# Patient Record
Sex: MALE | Race: WHITE | Employment: OTHER | URBAN - METROPOLITAN AREA
[De-identification: names, ages, dates, MRNs, and addresses within clinical notes are randomized per-mention and may not be internally consistent; named-entity substitution may affect disease eponyms.]

---

## 2017-09-25 ENCOUNTER — HOSPITAL ENCOUNTER (OUTPATIENT)
Dept: SURGERY | Age: 77
Discharge: HOME | End: 2017-09-25
Admitting: OPHTHALMOLOGY

## 2017-09-25 VITALS
SYSTOLIC BLOOD PRESSURE: 119 MMHG | RESPIRATION RATE: 16 BRPM | HEART RATE: 72 BPM | TEMPERATURE: 97 F | DIASTOLIC BLOOD PRESSURE: 72 MMHG | OXYGEN SATURATION: 98 %

## 2017-09-25 RX ORDER — SODIUM CHLORIDE 0.9 % (FLUSH) 0.9 %
10 SYRINGE (ML) INJECTION EVERY 12 HOURS SCHEDULED
Status: DISCONTINUED | OUTPATIENT
Start: 2017-09-25 | End: 2017-09-25 | Stop reason: SDUPTHER

## 2017-09-25 RX ORDER — SODIUM CHLORIDE 0.9 % (FLUSH) 0.9 %
10 SYRINGE (ML) INJECTION EVERY 12 HOURS SCHEDULED
Status: DISCONTINUED | OUTPATIENT
Start: 2017-09-25 | End: 2017-09-26 | Stop reason: HOSPADM

## 2017-09-25 RX ORDER — SODIUM CHLORIDE 9 MG/ML
INJECTION, SOLUTION INTRAVENOUS CONTINUOUS
Status: DISCONTINUED | OUTPATIENT
Start: 2017-09-25 | End: 2017-09-26 | Stop reason: HOSPADM

## 2017-09-25 RX ORDER — SODIUM CHLORIDE 0.9 % (FLUSH) 0.9 %
10 SYRINGE (ML) INJECTION PRN
Status: DISCONTINUED | OUTPATIENT
Start: 2017-09-25 | End: 2017-09-25 | Stop reason: SDUPTHER

## 2017-09-25 RX ORDER — ONDANSETRON 2 MG/ML
4 INJECTION INTRAMUSCULAR; INTRAVENOUS
Status: ACTIVE | OUTPATIENT
Start: 2017-09-25 | End: 2017-09-25

## 2017-09-25 RX ORDER — SODIUM CHLORIDE 0.9 % (FLUSH) 0.9 %
10 SYRINGE (ML) INJECTION PRN
Status: DISCONTINUED | OUTPATIENT
Start: 2017-09-25 | End: 2017-09-26 | Stop reason: HOSPADM

## 2017-09-25 RX ORDER — TETRACAINE HYDROCHLORIDE 5 MG/ML
1 SOLUTION OPHTHALMIC ONCE
Status: COMPLETED | OUTPATIENT
Start: 2017-09-25 | End: 2017-09-25

## 2017-09-25 RX ORDER — CIPROFLOXACIN HYDROCHLORIDE 3.5 MG/ML
1 SOLUTION/ DROPS TOPICAL SEE ADMIN INSTRUCTIONS
Status: DISCONTINUED | OUTPATIENT
Start: 2017-09-25 | End: 2017-09-26 | Stop reason: HOSPADM

## 2017-09-25 RX ADMIN — SODIUM CHLORIDE: 9 INJECTION, SOLUTION INTRAVENOUS at 10:24

## 2017-09-25 RX ADMIN — Medication: at 10:25

## 2017-09-25 RX ADMIN — TETRACAINE HYDROCHLORIDE 1 DROP: 5 SOLUTION OPHTHALMIC at 10:19

## 2017-09-25 RX ADMIN — CIPROFLOXACIN HYDROCHLORIDE 1 DROP: 3.5 SOLUTION/ DROPS TOPICAL at 10:19

## 2017-09-25 ASSESSMENT — PAIN - FUNCTIONAL ASSESSMENT: PAIN_FUNCTIONAL_ASSESSMENT: 0-10

## 2017-09-25 ASSESSMENT — ENCOUNTER SYMPTOMS: SHORTNESS OF BREATH: 0

## 2017-09-25 ASSESSMENT — PAIN SCALES - GENERAL: PAINLEVEL_OUTOF10: 0

## 2017-12-07 ENCOUNTER — HOSPITAL ENCOUNTER (OUTPATIENT)
Dept: ENDOSCOPY | Age: 77
Discharge: HOME | End: 2017-12-07
Attending: INTERNAL MEDICINE | Admitting: INTERNAL MEDICINE

## 2017-12-07 VITALS
RESPIRATION RATE: 14 BRPM | SYSTOLIC BLOOD PRESSURE: 109 MMHG | HEART RATE: 60 BPM | OXYGEN SATURATION: 98 % | TEMPERATURE: 97 F | DIASTOLIC BLOOD PRESSURE: 69 MMHG

## 2017-12-07 RX ORDER — SODIUM CHLORIDE 0.9 % (FLUSH) 0.9 %
10 SYRINGE (ML) INJECTION PRN
Status: DISCONTINUED | OUTPATIENT
Start: 2017-12-07 | End: 2017-12-07 | Stop reason: SDUPTHER

## 2017-12-07 RX ORDER — SODIUM CHLORIDE 0.9 % (FLUSH) 0.9 %
10 SYRINGE (ML) INJECTION EVERY 12 HOURS SCHEDULED
Status: DISCONTINUED | OUTPATIENT
Start: 2017-12-07 | End: 2017-12-07 | Stop reason: SDUPTHER

## 2017-12-07 RX ORDER — SODIUM CHLORIDE 9 MG/ML
INJECTION, SOLUTION INTRAVENOUS CONTINUOUS
Status: DISCONTINUED | OUTPATIENT
Start: 2017-12-07 | End: 2017-12-08 | Stop reason: HOSPADM

## 2017-12-07 RX ORDER — SODIUM CHLORIDE 9 MG/ML
INJECTION, SOLUTION INTRAVENOUS CONTINUOUS
Status: DISCONTINUED | OUTPATIENT
Start: 2017-12-07 | End: 2017-12-07 | Stop reason: SDUPTHER

## 2017-12-07 RX ORDER — SODIUM CHLORIDE 0.9 % (FLUSH) 0.9 %
10 SYRINGE (ML) INJECTION PRN
Status: DISCONTINUED | OUTPATIENT
Start: 2017-12-07 | End: 2017-12-08 | Stop reason: HOSPADM

## 2017-12-07 RX ORDER — SODIUM CHLORIDE 0.9 % (FLUSH) 0.9 %
10 SYRINGE (ML) INJECTION EVERY 12 HOURS SCHEDULED
Status: DISCONTINUED | OUTPATIENT
Start: 2017-12-07 | End: 2017-12-08 | Stop reason: HOSPADM

## 2017-12-07 RX ADMIN — SODIUM CHLORIDE: 9 INJECTION, SOLUTION INTRAVENOUS at 07:54

## 2017-12-07 ASSESSMENT — ENCOUNTER SYMPTOMS: SHORTNESS OF BREATH: 0

## 2017-12-07 ASSESSMENT — PAIN SCALES - GENERAL
PAINLEVEL_OUTOF10: 0

## 2017-12-07 NOTE — ANESTHESIA POST-OP
[address] [facility] Anesthesiology  Post-Anesthesia Note       Name:  Shelbi Alvarez                                         Age:  76 y.o. MRN:  [id number]     Last Vitals & Oxygen Saturation: /74   Pulse 71   Temp 97 °F (36.1 °C) (Temporal)   Resp 14   SpO2 97%   Patient Vitals for the past 4 hrs:   BP Temp Temp src Pulse Resp SpO2   12/07/17 0940 103/74 - - 71 14 97 %   12/07/17 0930 102/71 97 °F (36.1 °C) Temporal 60 16 99 %   12/07/17 0751 129/78 97.7 °F (36.5 °C) Temporal 70 16 97 %       Level of consciousness:  Awake, alert    Respiratory: Respirations easy, no distress. Stable. Cardiovascular: Hemodynamically stable. Hydration: Adequate. PONV: Adequately managed. Post-op pain: Adequately controlled. Post-op assessment: Tolerated anesthetic well without complication. Complications:  None.     Miguelina Greene MD  December 7, 2017   9:43 AM

## 2017-12-07 NOTE — OP NOTE
Endoscopy Note    Patient: Cassie Martinez  : 1940  Acct#:     Procedure: Esophagogastroduodenoscopy                       Colonoscopy with biopsy    Date:  2017     Surgeon:   Caron Estrada MD    Referring Physician:  Lilian JAVIER     Previous Colonoscopy: YES  Date: 5 Years Prior   Greater than 3 years: YES    Indications: This is a 76y.o. year old male who presents today with Abdominal Bloating/Belching/Constipation/Hx Colon Polyps     Postoperative Diagnosis:  1. Benign appearing Gastric Body Polyps 2. Ascending Colon Polyps 3. Transverse Colon Polyp 4. Pan diverticulosis     Anesthesia:  The patient was administered IV propofol per anesthesiology team.  Please see their operative records for full details. Consent:  The patient or their legal guardian has signed a consent, and is aware of the potential risks, benefits, alternatives, and potential complications of this procedure. These include, but are not limited to hemorrhage, bleeding, post procedural pain, perforation, phlebitis, aspiration, hypotension, hypoxia, cardiovascular events such as arryhthmia, and possibly death. Additionally, the possibility of missed colonic polyps and interval colon cancer was discussed in the consent. Description of Procedure: The patient was then taken to the endoscopy suite, placed in the left lateral decubitus position and the above IV sedation was administrered. The Olympus video endoscope was placed through the patient's oropharynx without difficulty to the extent of the 2nd portion of the duodenum. Both forward and retroflexed views of the stomach were obtained. Findings:    Esophagus: The esophagus appeared normal without evidence of Grewal's esophagus or reflux esophagitis. Stomach: The stomach had evidence of several small polyps (<5 mm) in the body of the stomach. The stomach otherwise appeared normal on forward and retroflexed views.     Duodenum: The 1st, 2nd, 3rd, and 4th portions of the duodenum appeared normal with normal villous pattern. Blood was noted in the second portion of the duodenum the bowel was closely examined with no bleeding lesions identified. It was unclear the source of blood or if the blood was swallowed. The scope was then withdrawn back into the stomach, it was decompressed, and the scope was completely withdrawn. A digital rectal examination was performed and revealed negative without mass, lesions or tenderness. The Olympus video colonoscope was placed in the patient's rectum under digital direction and advanced to the cecum. The cecum was identified by characteristic anatomy and ballottment. The prep was good. The ileocecal valve was identified. The terminal ileum was normal appearing. The scope was then withdrawn back through the cecum, ascending, transverse, descending, sigmoid colon, and rectum. Careful circumferential examination of the mucosa in these areas demonstrated:    1. A 3 mm polyp in the ascending colon removed completely with biopsy polypectomy. 2. A 4 mm polyp in the ascending  colon removed completely with biopsy polypectomy. 3. A 2 mm polyp in the ascending colon removed completely with biopsy polypectomy. 4. A 3 mm polyp in the transverse colon removed completely with biopsy polypectomy. 5. Multiple small and large mouthed diverticulum were present throughout the colon. The scope was then withdrawn into the rectum and retroflexed. The retroflexed view of the anal verge and rectum demonstrates normal rectum. The scope was straightened, the colon was decompressed and the scope was withdrawn from the patient. The patient tolerated the procedure well and was taken to the PACU in good condition. Impression:    1) See post procedure diagnoses    Recommendations:   1) Await pathology results. 2) Continue Align probiotics and recommend starting on Miralax for constipation.    3) I suspect blood in the second portion of the duodenum was swallowed blood as the entire duodenum was normal appearing with no clear bleeding sources. If symptoms persist would consider a CT abd/pelvis to exclude any alternative distal small bowel lesions. 4) I would not recommend further colonoscopies for screening purposes. 5) To attempt to limit adenomas: would avoid smoking, avoid more than 1 alcoholic drink daily, engage in regular physical activity, avoid obesity, limit red meat and processed meat intake, consider pescovegetarian diet (fish and vegetables), take in fiber from a variety of dietary sources, and consider Calcium and Vitamin D supplementation (calcium 1200 mg daily and Vitamin D 800 units daily). 6)  The patient had biopsies taken today. The patient should call for results in 7 days if they have not heard from our office.   Our number is [address] [facility] [facility]

## 2017-12-07 NOTE — ANESTHESIA PRE-OP
Department of Anesthesiology  Preprocedure Note       Name:  Sharyle Liter   Age:  76 y.o.  :  1940                                          MRN:  [id number]         Date:  2017        [facility] Department of Anesthesiology  Pre-Anesthesia Evaluation/Consultation       Name:  Sharyle Liter                                         Age:  76 y.o. MRN:  [id number]           Procedure (Scheduled):  EGD and colonoscopy  Surgeon:  Dr. Neil Keys      No Known Allergies  Patient Active Problem List   Diagnosis    Rotator cuff (capsule) sprain     Past Medical History:   Diagnosis Date    Arthritis     BPH (benign prostatic hyperplasia)     GERD (gastroesophageal reflux disease)     Hyperlipidemia     Irregular heartbeat     Sleep apnea     has cpap machine but does not use     Past Surgical History:   Procedure Laterality Date    CATARACT REMOVAL      left eye    CATARACT REMOVAL WITH IMPLANT Bilateral     COLONOSCOPY      EYE SURGERY Left     FINGER SURGERY      FOOT NEUROMA SURGERY      left foot    FOOT SURGERY  [id number]    EXCISION NEUROMA LEFT 3RD INTERSPACE      TONSILLECTOMY       Social History   Substance Use Topics    Smoking status: Never Smoker    Smokeless tobacco: Never Used    Alcohol use Yes      Comment: rare     Medications  Current Outpatient Prescriptions on File Prior to Encounter   Medication Sig Dispense Refill    MULTIPLE VITAMIN PO Take 1 tablet by mouth daily      latanoprost (XALATAN) 0.005 % ophthalmic solution Place 1 drop into the left eye nightly      pantoprazole (PROTONIX) 40 MG tablet Take 40 mg by mouth daily.  terazosin (HYTRIN) 5 MG capsule Take 5 mg by mouth nightly. Uses medication to treat BPH      finasteride (PROSCAR) 5 MG tablet Take 5 mg by mouth daily. No current facility-administered medications on file prior to encounter.       Current Outpatient Prescriptions   Medication Sig Dispense Refill    MULTIPLE VITAMIN PO Take 1 tablet by FB Dental:      Comment: Missing teeth    Pulmonary: breath sounds clear to auscultation  (+) sleep apnea: on noncompliant,      (-) COPD, asthma, shortness of breath and recent URI                           Cardiovascular:    (+) hyperlipidemia    (-) hypertension, valvular problems/murmurs, past MI, CAD, CABG/stent, dysrhythmias,  angina,  CHF, orthopnea and murmur      Rhythm: regular  Rate: normal                    Neuro/Psych:      (-) seizures, neuromuscular disease, TIA, CVA and headaches           GI/Hepatic/Renal:   (+) GERD:,      (-) PUD, hepatitis, liver disease and no renal disease       Endo/Other:    (+) : arthritis:., .    (-) hypothyroidism, hyperthyroidism, blood dyscrasia, no Type II DM, no Type I DM               Abdominal:           Vascular:                                          Anesthesia Plan      MAC     ASA 3       Induction: intravenous. Anesthetic plan and risks discussed with patient. Plan discussed with CRNA. DOS STAFF ADDENDUM:    Pt seen and examined, chart reviewed (including anesthesia, drug and allergy history). No interval changes to history and physical examination. Anesthetic plan, risks, benefits, alternatives, and personnel involved discussed with patient. Patient verbalized an understanding and agrees to proceed.       Ronnie Grimaldo MD  December 7, 2017  7:28 AM        Ronnie Grimaldo MD   12/7/2017

## 2021-03-16 ENCOUNTER — IMMUNIZATION (OUTPATIENT)
Dept: PRIMARY CARE CLINIC | Age: 81
End: 2021-03-16
Payer: MEDICARE

## 2021-03-16 PROCEDURE — 91300 COVID-19, PFIZER VACCINE 30MCG/0.3ML DOSE: CPT | Performed by: FAMILY MEDICINE

## 2021-03-16 PROCEDURE — 0001A PR IMM ADMN SARSCOV2 30MCG/0.3ML DIL RECON 1ST DOSE: CPT | Performed by: FAMILY MEDICINE

## 2021-04-06 ENCOUNTER — IMMUNIZATION (OUTPATIENT)
Dept: PRIMARY CARE CLINIC | Age: 81
End: 2021-04-06
Payer: MEDICARE

## 2021-04-06 PROCEDURE — 0002A COVID-19, PFIZER VACCINE 30MCG/0.3ML DOSE: CPT | Performed by: FAMILY MEDICINE

## 2021-04-06 PROCEDURE — 91300 COVID-19, PFIZER VACCINE 30MCG/0.3ML DOSE: CPT | Performed by: FAMILY MEDICINE

## 2021-08-27 ENCOUNTER — ANESTHESIA EVENT (OUTPATIENT)
Dept: ENDOSCOPY | Age: 81
End: 2021-08-27
Payer: MEDICARE

## 2021-08-27 RX ORDER — ESOMEPRAZOLE MAGNESIUM 40 MG/1
40 FOR SUSPENSION ORAL DAILY
COMMUNITY

## 2021-08-27 NOTE — PROGRESS NOTES
SAFETY FIRST. .call before you fall      [address]  time______0630______        Surgery time____________    Take the following medications with a sip of water: Follow your Doctor's pre procedure instructions regarding medications    Do not eat or drink anything after 12:00 midnight prior to your surgery. This includes water chewing gum, mints and ice chips. You may brush your teeth and gargle the morning of your surgery, but do not swallow the water     Please see your family doctor/pediatrician for a history and physical and/or concerning medications. Bring any test results/reports from your physicians office. If you are under the care of a heart doctor or specialist doctor, please be aware that you may be asked to them for clearance    You may be asked to stop blood thinners such as Coumadin, Plavix, Fragmin, Lovenox, etc., or any anti-inflammatories such as:  Aspirin, Ibuprofen, Advil, Naproxen prior to your surgery. We also ask that you stop any OTC medications such as fish oil, vitamin E, glucosamine, garlic, Multivitamins, COQ 10, etc.    We ask that you do not smoke 24 hours prior to surgery  We ask that you do not  drink any alcoholic beverages 24 hours prior to surgery     You must make arrangements for a responsible adult to take you home after your surgery. For your safety you will not be allowed to leave alone or drive yourself home. Your surgery will be cancelled if you do not have a ride home. Also for your safety, it is strongly suggested that someone stay with you the first 24 hours after your surgery. A parent or legal guardian must accompany a child scheduled for surgery and plan to stay at the hospital until the child is discharged. Please do not bring other children with you. For your comfort, please wear simple loose fitting clothing to the hospital.  Please do not bring valuables.     Do not wear any make-up or nail polish on your fingers or toes      For your safety, please do not wear any jewelry or body piercing's on the day of surgery. All jewelry must be removed. If you have dentures, they will be removed before going to operating room. For your convenience, we will provide you with a container. If you wear contact lenses or glasses, they will be removed, please bring a case for them. If you have a living will and a durable power of  for healthcare, please bring in a copy. As part of our patient safety program to minimize surgical site infections, we ask you to do the following:    · Please notify your surgeon if you develop any illness between         now and the  day of your surgery. · This includes a cough, cold, fever, sore throat, nausea,         or vomiting, and diarrhea, etc.  ·  Please notify your surgeon if you experience dizziness, shortness         of breath or blurred vision between now and the time of your surgery. Do not shave your operative site 96 hours prior to surgery. For face and neck surgery, men may use an electric razor 48 hours   prior to surgery. You may shower the night before surgery or the morning of   your surgery with an antibacterial soap. You will need to bring a photo ID and insurance card    [facility] has an onsite pharmacy, would you like to utilize our pharmacy     If you will be staying overnight and use a C-pap machine, please bring   your C-pap to hospital     Our goal is to provide you with excellent care, therefore, visitors will be limited to two(2) in the room at a time so that we may focus on providing this care for you. Please contact pre-admission testing if you have any further questions.                  [facility] phone number:  [phone] PAT fax number:  [phone]  Please note these are generalized instructions for all surgical cases, you may be provided with more specific instructions according to your surgery. Preoperative Screening for Elective Surgery/Invasive Procedures While COVID-19 present in the community     Have you tested positive or have been told to self-isolate for COVID-19 like symptoms within the past 28 days? no   Do you currently have any of the following symptoms?no  o Fever >100.0 F or 99.9 F in immunocompromised patients? o New onset cough, shortness of breath or difficulty breathing?  o New onset sore throat, myalgia (muscle aches and pains), headache, loss of taste/smell or diarrhea?  Have you had a potential exposure to COVID-19 within the past 14 days by:no  o Close contact with a confirmed case? o Close contact with a healthcare worker,  or essential infrastructure worker (grocery store, [employer], gas station, public utilities or transportation)? o Do you reside in a congregate setting such as; skilled nursing facility, adult home, correctional facility, homeless shelter or other institutional setting?  o Have you had recent travel to a known COVID-19 hotspot? Indicate if the patient has a positive screen by answering yes to one or more of the above questions. Patients who test positive or screen positive prior to surgery or on the day of surgery should be evaluated in conjunction with the surgeon/proceduralist/anesthesiologist to determine the urgency of the procedure.

## 2021-08-30 ENCOUNTER — HOSPITAL ENCOUNTER (OUTPATIENT)
Age: 81
Setting detail: OUTPATIENT SURGERY
Discharge: HOME | End: 2021-08-30
Attending: INTERNAL MEDICINE | Admitting: INTERNAL MEDICINE
Payer: MEDICARE

## 2021-08-30 ENCOUNTER — ANESTHESIA (OUTPATIENT)
Dept: ENDOSCOPY | Age: 81
End: 2021-08-30
Payer: MEDICARE

## 2021-08-30 VITALS
HEART RATE: 52 BPM | DIASTOLIC BLOOD PRESSURE: 77 MMHG | RESPIRATION RATE: 18 BRPM | WEIGHT: 170 LBS | BODY MASS INDEX: 26.68 KG/M2 | SYSTOLIC BLOOD PRESSURE: 124 MMHG | TEMPERATURE: 97.1 F | OXYGEN SATURATION: 98 % | HEIGHT: 67 IN

## 2021-08-30 VITALS
DIASTOLIC BLOOD PRESSURE: 76 MMHG | RESPIRATION RATE: 14 BRPM | SYSTOLIC BLOOD PRESSURE: 123 MMHG | OXYGEN SATURATION: 96 %

## 2021-08-30 PROCEDURE — 2709999900 HC NON-CHARGEABLE SUPPLY: Performed by: INTERNAL MEDICINE

## 2021-08-30 PROCEDURE — 7100000011 HC PHASE II RECOVERY - ADDTL 15 MIN: Performed by: INTERNAL MEDICINE

## 2021-08-30 PROCEDURE — 3609017100 HC EGD: Performed by: INTERNAL MEDICINE

## 2021-08-30 PROCEDURE — 3700000001 HC ADD 15 MINUTES (ANESTHESIA): Performed by: INTERNAL MEDICINE

## 2021-08-30 PROCEDURE — 7100000000 HC PACU RECOVERY - FIRST 15 MIN: Performed by: INTERNAL MEDICINE

## 2021-08-30 PROCEDURE — 2580000003 HC RX 258: Performed by: ANESTHESIOLOGY

## 2021-08-30 PROCEDURE — 7100000010 HC PHASE II RECOVERY - FIRST 15 MIN: Performed by: INTERNAL MEDICINE

## 2021-08-30 PROCEDURE — 6360000002 HC RX W HCPCS: Performed by: NURSE ANESTHETIST, CERTIFIED REGISTERED

## 2021-08-30 PROCEDURE — 3700000000 HC ANESTHESIA ATTENDED CARE: Performed by: INTERNAL MEDICINE

## 2021-08-30 PROCEDURE — 7100000001 HC PACU RECOVERY - ADDTL 15 MIN: Performed by: INTERNAL MEDICINE

## 2021-08-30 PROCEDURE — 2500000003 HC RX 250 WO HCPCS: Performed by: NURSE ANESTHETIST, CERTIFIED REGISTERED

## 2021-08-30 RX ORDER — SODIUM CHLORIDE 9 MG/ML
25 INJECTION, SOLUTION INTRAVENOUS PRN
Status: DISCONTINUED | OUTPATIENT
Start: 2021-08-30 | End: 2021-08-30 | Stop reason: HOSPADM

## 2021-08-30 RX ORDER — PROMETHAZINE HYDROCHLORIDE 25 MG/ML
6.25 INJECTION, SOLUTION INTRAMUSCULAR; INTRAVENOUS
Status: DISCONTINUED | OUTPATIENT
Start: 2021-08-30 | End: 2021-08-30 | Stop reason: HOSPADM

## 2021-08-30 RX ORDER — SODIUM CHLORIDE 0.9 % (FLUSH) 0.9 %
10 SYRINGE (ML) INJECTION EVERY 12 HOURS SCHEDULED
Status: DISCONTINUED | OUTPATIENT
Start: 2021-08-30 | End: 2021-08-30 | Stop reason: HOSPADM

## 2021-08-30 RX ORDER — SODIUM CHLORIDE 0.9 % (FLUSH) 0.9 %
10 SYRINGE (ML) INJECTION PRN
Status: DISCONTINUED | OUTPATIENT
Start: 2021-08-30 | End: 2021-08-30 | Stop reason: HOSPADM

## 2021-08-30 RX ORDER — PROPOFOL 10 MG/ML
INJECTION, EMULSION INTRAVENOUS CONTINUOUS PRN
Status: DISCONTINUED | OUTPATIENT
Start: 2021-08-30 | End: 2021-08-30 | Stop reason: SDUPTHER

## 2021-08-30 RX ORDER — LIDOCAINE HYDROCHLORIDE 20 MG/ML
INJECTION, SOLUTION EPIDURAL; INFILTRATION; INTRACAUDAL; PERINEURAL PRN
Status: DISCONTINUED | OUTPATIENT
Start: 2021-08-30 | End: 2021-08-30 | Stop reason: SDUPTHER

## 2021-08-30 RX ORDER — ONDANSETRON 2 MG/ML
4 INJECTION INTRAMUSCULAR; INTRAVENOUS
Status: DISCONTINUED | OUTPATIENT
Start: 2021-08-30 | End: 2021-08-30 | Stop reason: HOSPADM

## 2021-08-30 RX ORDER — LABETALOL HYDROCHLORIDE 5 MG/ML
5 INJECTION, SOLUTION INTRAVENOUS EVERY 10 MIN PRN
Status: DISCONTINUED | OUTPATIENT
Start: 2021-08-30 | End: 2021-08-30 | Stop reason: HOSPADM

## 2021-08-30 RX ORDER — SODIUM CHLORIDE 9 MG/ML
INJECTION, SOLUTION INTRAVENOUS CONTINUOUS
Status: DISCONTINUED | OUTPATIENT
Start: 2021-08-30 | End: 2021-08-30 | Stop reason: HOSPADM

## 2021-08-30 RX ADMIN — LIDOCAINE HYDROCHLORIDE 60 MG: 20 INJECTION, SOLUTION EPIDURAL; INFILTRATION; INTRACAUDAL; PERINEURAL at 08:03

## 2021-08-30 RX ADMIN — PROPOFOL 300 MCG/KG/MIN: 10 INJECTION, EMULSION INTRAVENOUS at 08:05

## 2021-08-30 RX ADMIN — SODIUM CHLORIDE: 9 INJECTION, SOLUTION INTRAVENOUS at 06:51

## 2021-08-30 ASSESSMENT — PULMONARY FUNCTION TESTS
PIF_VALUE: 0

## 2021-08-30 ASSESSMENT — PAIN SCALES - GENERAL
PAINLEVEL_OUTOF10: 0
PAINLEVEL_OUTOF10: 0

## 2021-08-30 ASSESSMENT — PAIN - FUNCTIONAL ASSESSMENT: PAIN_FUNCTIONAL_ASSESSMENT: 0-10

## 2021-08-30 NOTE — ANESTHESIA POSTPROCEDURE EVALUATION
[facility] Department of Anesthesiology  Post-Anesthesia Note       Name:  Ishmael Sorto                                  Age:  78 y.o. MRN:  [id number]     Last Vitals & Oxygen Saturation: /77   Pulse 52   Temp 97.1 °F (36.2 °C) (Temporal)   Resp 18   Ht 5' 7\" (1.702 m)   Wt 170 lb (77.1 kg)   SpO2 98%   BMI 26.63 kg/m²   Patient Vitals for the past 4 hrs:   BP Temp Temp src Pulse Resp SpO2 Height Weight   08/30/21 0906 124/77   52 18 98 %     08/30/21 0843 (!) 146/81   56 18 96 %     08/30/21 0830 106/76 97.1 °F (36.2 °C) Temporal 56 19 97 %     08/30/21 0827 106/76   57 16 97 %     08/30/21 0823 106/70   57 12 97 %     08/30/21 0818  97.9 °F (36.6 °C) Temporal   98 %     08/30/21 0639 103/71 97.6 °F (36.4 °C) Temporal 60 17 98 % 5' 7\" (1.702 m) 170 lb (77.1 kg)       Level of consciousness:  Awake, alert    Respiratory: Respirations easy, no distress. Stable. Cardiovascular: Hemodynamically stable. Hydration: Adequate. PONV: Adequately managed. Post-op pain: Adequately controlled. Post-op assessment: Tolerated anesthetic well without complication. Complications:  None.     Arpita Lozano MD  August 30, 2021   9:49 AM

## 2021-08-30 NOTE — PROGRESS NOTES
Medications administered and monitored by CRNA, see anesthesia record. Medications administered and monitored by CRNA, see anesthesia record.     Electronically signed by Marta Villarreal RN on 8/30/2021 at 8:01 AM

## 2021-08-30 NOTE — OP NOTE
noticed improvement in symptoms with switch to Nexium and would recommend continuing. Throat discomfort may also be secondary to post-nasal drip from chronic sinusitis.      Iza Garcia MD  [address] [facility]

## 2021-08-30 NOTE — H&P
7\" (1.702 m)   Wt 170 lb (77.1 kg)   SpO2 98%   BMI 26.63 kg/m²    Airway: No stridor or wheezing noted. Good air movement  Pulmonary: without wheezes. Clear to auscultation  Cardiac:regular rate and rhythm without loud murmurs  Abdomen:soft, nontender,  Bowel sounds present    Pre-Procedure Assessment / Plan:  1) EGD    ASA Grade:  ASA 3 - Patient with moderate systemic disease with functional limitations  Mallampati Classification:  Class III    Level of Sedation Plan:Deep sedation    Post Procedure plan: Return to same level of care    I assessed the patient and find that the patient is in satisfactory condition to proceed with the planned procedure and sedation plan. I have explained the risk, benefits, and alternatives to the procedure; the patient understands and agrees to proceed.        Jaspreet Parks MD  8/30/2021

## 2021-08-30 NOTE — ANESTHESIA PRE PROCEDURE
[facility] Department of Anesthesiology  Pre-Anesthesia Evaluation/Consultation       Name:  Carmenza Grubbs  : 1940  Age:  78 y.o. MRN:  [id number]  Date: 2021           Surgeon: Surgeon(s):  Neville Howell MD    Procedure: Procedure(s):  ESOPHAGOGASTRODUODENOSCOPY     No Known Allergies  Patient Active Problem List   Diagnosis    Rotator cuff (capsule) sprain     Past Medical History:   Diagnosis Date    Arthritis     BPH (benign prostatic hyperplasia)     GERD (gastroesophageal reflux disease)     Hyperlipidemia     Irregular heartbeat     Sleep apnea     has cpap machine but does not use     Past Surgical History:   Procedure Laterality Date    CATARACT REMOVAL      left eye    CATARACT REMOVAL WITH IMPLANT Bilateral     COLONOSCOPY  2017    Dr. Alee Silver with multiple polypectomies    EYE SURGERY Left     FINGER SURGERY      FOOT NEUROMA SURGERY      left foot    FOOT SURGERY  [id number]    EXCISION NEUROMA LEFT 3RD INTERSPACE      TONSILLECTOMY       Social History     Tobacco Use    Smoking status: Never Smoker    Smokeless tobacco: Never Used   Vaping Use    Vaping Use: Never used   Substance Use Topics    Alcohol use: Yes     Comment: rare    Drug use: No     Medications  No current facility-administered medications on file prior to encounter. Current Outpatient Medications on File Prior to Encounter   Medication Sig Dispense Refill    terazosin (HYTRIN) 5 MG capsule Take 5 mg by mouth nightly. Uses medication to treat BPH      finasteride (PROSCAR) 5 MG tablet Take 5 mg by mouth daily.  esomeprazole Magnesium (NEXIUM) 40 MG PACK Take 40 mg by mouth daily      MULTIPLE VITAMIN PO Take 1 tablet by mouth daily      latanoprost (XALATAN) 0.005 % ophthalmic solution Place 1 drop into the left eye nightly      pantoprazole (PROTONIX) 40 MG tablet Take 40 mg by mouth daily.          Current Facility-Administered Medications   Medication Dose Route Frequency Provider Last Rate Last Admin    0.9 % sodium chloride infusion   IntraVENous Continuous Anton Jacobs  mL/hr at 21 New Bag at 21    sodium chloride flush 0.9 % injection 10 mL  10 mL IntraVENous 2 times per day Anton Jacobs MD        sodium chloride flush 0.9 % injection 10 mL  10 mL IntraVENous PRN Anton Jacobs MD        0.9 % sodium chloride infusion  25 mL IntraVENous PRN Anton Jacobs MD         Vital Signs (Current)   Vitals:    21   BP: 103/71   Pulse: 60   Resp: 17   Temp: 97.6 °F (36.4 °C)   SpO2: 98%     Vital Signs Statistics (for past 48 hrs)     Temp  Av.6 °F (36.4 °C)  Min: 97.6 °F (36.4 °C)   Min taken time: 21  Max: 97.6 °F (36.4 °C)   Max taken time: 21  Pulse  Av  Min: 61   Min taken time: 21  Max: 61   Max taken time: 21  Resp  Av  Min: 16   Min taken time: 21  Max: 16   Max taken time: 21  BP  Min: 103/71   Min taken time: 21  Max: 103/71   Max taken time: 21  SpO2  Av %  Min: 98 %   Min taken time: 21  Max: 98 %   Max taken time: 21    BP Readings from Last 3 Encounters:   21 103/71   18 (!) 139/100   17 109/69     BMI  Body mass index is 26.63 kg/m². Estimated body mass index is 26.63 kg/m² as calculated from the following:    Height as of this encounter: 5' 7\" (1.702 m). Weight as of this encounter: 170 lb (77.1 kg).     CBC   Lab Results   Component Value Date    WBC 7.6 2018    RBC 4.47 2018    HGB 13.9 2018    HCT 41.3 2018    MCV 92.5 2018    RDW 13.1 2018     2018     CMP    Lab Results   Component Value Date     2018    K 3.6 2018     2018    CO2 27 2018    BUN 14 2018    CREATININE 0.8 2018    GFRAA >60 2018 AGRATIO 1.7 04/01/2018    LABGLOM >60 04/01/2018    GLUCOSE 100 04/01/2018    PROT 6.8 04/01/2018    CALCIUM 9.0 04/01/2018    BILITOT <0.2 04/01/2018    ALKPHOS 84 04/01/2018    AST 18 04/01/2018    ALT 13 04/01/2018     BMP    Lab Results   Component Value Date     04/01/2018    K 3.6 04/01/2018     04/01/2018    CO2 27 04/01/2018    BUN 14 04/01/2018    CREATININE 0.8 04/01/2018    CALCIUM 9.0 04/01/2018    GFRAA >60 04/01/2018    LABGLOM >60 04/01/2018    GLUCOSE 100 04/01/2018     POCGlucose  No results for input(s): GLUCOSE in the last 72 hours. Coags    Lab Results   Component Value Date    PROTIME 11.7 04/01/2018    INR 1.04 95/27/1068     HCG (If Applicable) No results found for: PREGTESTUR, PREGSERUM, HCG, HCGQUANT   ABGs No results found for: PHART, PO2ART, LST9VPL, UJB9VDY, BEART, T2OWOJOU   Type & Screen (If Applicable)  No results found for: LABABO, LABRH                         BMI: Wt Readings from Last 3 Encounters:       NPO Status:   Date of last liquid consumption: 08/29/21   Time of last liquid consumption: 2300   Date of last solid food consumption: 08/29/21      Time of last solid consumption: 2300       Anesthesia Evaluation  Patient summary reviewed no history of anesthetic complications:   Airway: Mallampati: III  TM distance: >3 FB   Neck ROM: full   Dental:          Pulmonary:normal exam    (+) sleep apnea: on noncompliant,                             Cardiovascular:  Exercise tolerance: good (>4 METS),   (+) dysrhythmias:,       ECG reviewed  Rhythm: regular  Rate: normal           Beta Blocker:  Not on Beta Blocker         Neuro/Psych:   Negative Neuro/Psych ROS              GI/Hepatic/Renal:   (+) GERD: well controlled,           Endo/Other: Negative Endo/Other ROS                    Abdominal:             Vascular: negative vascular ROS. Other Findings:             Anesthesia Plan      MAC     ASA 3       Induction: intravenous.       Anesthetic plan and risks discussed with patient and spouse. Plan discussed with CRNA. This pre-anesthesia assessment may be used as a history and physical.    DOS STAFF ADDENDUM:    Pt seen and examined, chart reviewed (including anesthesia, drug and allergy history). No interval changes to history and physical examination. Anesthetic plan, risks, benefits, alternatives, and personnel involved discussed with patient. Questions and concerns addressed. Patient(family) verbalized an understanding and agrees to proceed.       Jorge Negron MD  August 30, 2021  7:41 AM

## 2024-02-20 ENCOUNTER — APPOINTMENT (OUTPATIENT)
Dept: CT IMAGING | Age: 84
DRG: 552 | End: 2024-02-20
Payer: OTHER MISCELLANEOUS

## 2024-02-20 ENCOUNTER — HOSPITAL ENCOUNTER (INPATIENT)
Age: 84
LOS: 2 days | Discharge: NURSING, REHAB OR LONG-TERM CARE | DRG: 552 | End: 2024-02-23
Attending: EMERGENCY MEDICINE | Admitting: HOSPITALIST
Payer: OTHER MISCELLANEOUS

## 2024-02-20 DIAGNOSIS — S22.000A COMPRESSION FRACTURE OF THORACIC VERTEBRA, UNSPECIFIED THORACIC VERTEBRAL LEVEL, INITIAL ENCOUNTER (HCC): ICD-10-CM

## 2024-02-20 DIAGNOSIS — S32.000A COMPRESSION FRACTURE OF LUMBAR VERTEBRA, UNSPECIFIED LUMBAR VERTEBRAL LEVEL, INITIAL ENCOUNTER (HCC): ICD-10-CM

## 2024-02-20 DIAGNOSIS — R91.1 LUNG NODULE: ICD-10-CM
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
## 2024-02-20 PROBLEM — G47.33 OSA (OBSTRUCTIVE SLEEP APNEA): Status: ACTIVE | Noted: 2024-02-20

## 2024-02-20 PROBLEM — K21.9 GASTROESOPHAGEAL REFLUX DISEASE WITHOUT ESOPHAGITIS: Status: ACTIVE | Noted: 2024-02-20

## 2024-02-20 PROBLEM — N40.0 BPH (BENIGN PROSTATIC HYPERPLASIA): Status: ACTIVE | Noted: 2024-02-20

## 2024-02-20 PROBLEM — E78.00 HYPERCHOLESTEROLEMIA: Status: ACTIVE | Noted: 2024-02-20

## 2024-02-20 LAB
ALBUMIN SERPL-MCNC: 4.2 G/DL (ref 3.4–5)
ALBUMIN/GLOB SERPL: 1.3 {RATIO} (ref 1.1–2.2)
ALP SERPL-CCNC: 74 U/L (ref 40–129)
ALT SERPL-CCNC: 18 U/L (ref 10–40)
ANION GAP SERPL CALCULATED.3IONS-SCNC: 11 MMOL/L (ref 3–16)
AST SERPL-CCNC: 34 U/L (ref 15–37)
BASOPHILS # BLD: 0 K/UL (ref 0–0.2)
BASOPHILS NFR BLD: 0.5 %
BILIRUB SERPL-MCNC: 0.7 MG/DL (ref 0–1)
BUN SERPL-MCNC: 13 MG/DL (ref 7–20)
CALCIUM SERPL-MCNC: 9.6 MG/DL (ref 8.3–10.6)
CHLORIDE SERPL-SCNC: 102 MMOL/L (ref 99–110)
CO2 SERPL-SCNC: 24 MMOL/L (ref 21–32)
CREAT SERPL-MCNC: 0.7 MG/DL (ref 0.8–1.3)
DEPRECATED RDW RBC AUTO: 14.1 % (ref 12.4–15.4)
EOSINOPHIL # BLD: 0.4 K/UL (ref 0–0.6)
EOSINOPHIL NFR BLD: 6 %
GFR SERPLBLD CREATININE-BSD FMLA CKD-EPI: >60 ML/MIN/{1.73_M2}
GLUCOSE SERPL-MCNC: 90 MG/DL (ref 70–99)
HCT VFR BLD AUTO: 38.7 % (ref 40.5–52.5)
HGB BLD-MCNC: 13.3 G/DL (ref 13.5–17.5)
LYMPHOCYTES # BLD: 1.1 K/UL (ref 1–5.1)
LYMPHOCYTES NFR BLD: 19.3 %
MCH RBC QN AUTO: 32.2 PG (ref 26–34)
MCHC RBC AUTO-ENTMCNC: 34.3 G/DL (ref 31–36)
MCV RBC AUTO: 94 FL (ref 80–100)
MONOCYTES # BLD: 0.5 K/UL (ref 0–1.3)
MONOCYTES NFR BLD: 8 %
NEUTROPHILS # BLD: 3.9 K/UL (ref 1.7–7.7)
NEUTROPHILS NFR BLD: 66.2 %
PLATELET # BLD AUTO: 165 K/UL (ref 135–450)
PMV BLD AUTO: 8.2 FL (ref 5–10.5)
POTASSIUM SERPL-SCNC: 4.5 MMOL/L (ref 3.5–5.1)
PROT SERPL-MCNC: 7.4 G/DL (ref 6.4–8.2)
RBC # BLD AUTO: 4.11 M/UL (ref 4.2–5.9)
SODIUM SERPL-SCNC: 137 MMOL/L (ref 136–145)
WBC # BLD AUTO: 5.9 K/UL (ref 4–11)

## 2024-02-20 PROCEDURE — 70450 CT HEAD/BRAIN W/O DYE: CPT

## 2024-02-20 PROCEDURE — 6360000004 HC RX CONTRAST MEDICATION: Performed by: EMERGENCY MEDICINE

## 2024-02-20 PROCEDURE — 36415 COLL VENOUS BLD VENIPUNCTURE: CPT

## 2024-02-20 PROCEDURE — 96374 THER/PROPH/DIAG INJ IV PUSH: CPT

## 2024-02-20 PROCEDURE — 72125 CT NECK SPINE W/O DYE: CPT

## 2024-02-20 PROCEDURE — 71260 CT THORAX DX C+: CPT

## 2024-02-20 PROCEDURE — 80053 COMPREHEN METABOLIC PANEL: CPT

## 2024-02-20 PROCEDURE — 99285 EMERGENCY DEPT VISIT HI MDM: CPT

## 2024-02-20 PROCEDURE — 85025 COMPLETE CBC W/AUTO DIFF WBC: CPT

## 2024-02-20 PROCEDURE — G0378 HOSPITAL OBSERVATION PER HR: HCPCS

## 2024-02-20 PROCEDURE — 6360000002 HC RX W HCPCS: Performed by: EMERGENCY MEDICINE

## 2024-02-20 RX ORDER — POTASSIUM CHLORIDE 7.45 MG/ML
10 INJECTION INTRAVENOUS PRN
Status: DISCONTINUED | OUTPATIENT
Start: 2024-02-20 | End: 2024-02-23 | Stop reason: HOSPADM

## 2024-02-20 RX ORDER — CYCLOBENZAPRINE HCL 10 MG
10 TABLET ORAL 3 TIMES DAILY PRN
Status: DISCONTINUED | OUTPATIENT
Start: 2024-02-20 | End: 2024-02-23 | Stop reason: HOSPADM

## 2024-02-20 RX ORDER — ACETAMINOPHEN 325 MG/1
650 TABLET ORAL EVERY 6 HOURS PRN
Status: DISCONTINUED | OUTPATIENT
Start: 2024-02-20 | End: 2024-02-23 | Stop reason: HOSPADM

## 2024-02-20 RX ORDER — POTASSIUM CHLORIDE 20 MEQ/1
40 TABLET, EXTENDED RELEASE ORAL PRN
Status: DISCONTINUED | OUTPATIENT
Start: 2024-02-20 | End: 2024-02-23 | Stop reason: HOSPADM

## 2024-02-20 RX ORDER — MAGNESIUM HYDROXIDE/ALUMINUM HYDROXICE/SIMETHICONE 120; 1200; 1200 MG/30ML; MG/30ML; MG/30ML
30 SUSPENSION ORAL EVERY 6 HOURS PRN
Status: DISCONTINUED | OUTPATIENT
Start: 2024-02-20 | End: 2024-02-23 | Stop reason: HOSPADM

## 2024-02-20 RX ORDER — MAGNESIUM SULFATE IN WATER 40 MG/ML
2000 INJECTION, SOLUTION INTRAVENOUS PRN
Status: DISCONTINUED | OUTPATIENT
Start: 2024-02-20 | End: 2024-02-23 | Stop reason: HOSPADM

## 2024-02-20 RX ORDER — SODIUM CHLORIDE 0.9 % (FLUSH) 0.9 %
5-40 SYRINGE (ML) INJECTION PRN
Status: DISCONTINUED | OUTPATIENT
Start: 2024-02-20 | End: 2024-02-23 | Stop reason: HOSPADM

## 2024-02-20 RX ORDER — ONDANSETRON 2 MG/ML
4 INJECTION INTRAMUSCULAR; INTRAVENOUS EVERY 6 HOURS PRN
Status: DISCONTINUED | OUTPATIENT
Start: 2024-02-20 | End: 2024-02-23 | Stop reason: HOSPADM

## 2024-02-20 RX ORDER — KETOROLAC TROMETHAMINE 15 MG/ML
15 INJECTION, SOLUTION INTRAMUSCULAR; INTRAVENOUS EVERY 6 HOURS PRN
Status: DISCONTINUED | OUTPATIENT
Start: 2024-02-20 | End: 2024-02-23 | Stop reason: HOSPADM

## 2024-02-20 RX ORDER — DORZOLAMIDE HYDROCHLORIDE AND TIMOLOL MALEATE 20; 5 MG/ML; MG/ML
1 SOLUTION/ DROPS OPHTHALMIC 2 TIMES DAILY
Status: ON HOLD | COMMUNITY

## 2024-02-20 RX ORDER — SODIUM CHLORIDE 0.9 % (FLUSH) 0.9 %
5-40 SYRINGE (ML) INJECTION EVERY 12 HOURS SCHEDULED
Status: DISCONTINUED | OUTPATIENT
Start: 2024-02-20 | End: 2024-02-23 | Stop reason: HOSPADM

## 2024-02-20 RX ORDER — ONDANSETRON 4 MG/1
4 TABLET, ORALLY DISINTEGRATING ORAL EVERY 8 HOURS PRN
Status: DISCONTINUED | OUTPATIENT
Start: 2024-02-20 | End: 2024-02-23 | Stop reason: HOSPADM

## 2024-02-20 RX ORDER — DORZOLAMIDE HYDROCHLORIDE AND TIMOLOL MALEATE 20; 5 MG/ML; MG/ML
1 SOLUTION/ DROPS OPHTHALMIC 2 TIMES DAILY
Status: DISCONTINUED | OUTPATIENT
Start: 2024-02-20 | End: 2024-02-23 | Stop reason: HOSPADM

## 2024-02-20 RX ORDER — DOXAZOSIN MESYLATE 4 MG/1
4 TABLET ORAL NIGHTLY
Status: DISCONTINUED | OUTPATIENT
Start: 2024-02-20 | End: 2024-02-23 | Stop reason: HOSPADM

## 2024-02-20 RX ORDER — ENOXAPARIN SODIUM 100 MG/ML
40 INJECTION SUBCUTANEOUS DAILY
Status: DISCONTINUED | OUTPATIENT
Start: 2024-02-21 | End: 2024-02-23 | Stop reason: HOSPADM

## 2024-02-20 RX ORDER — MORPHINE SULFATE 4 MG/ML
4 INJECTION, SOLUTION INTRAMUSCULAR; INTRAVENOUS ONCE
Status: COMPLETED | OUTPATIENT
Start: 2024-02-20 | End: 2024-02-20

## 2024-02-20 RX ORDER — ACETAMINOPHEN 650 MG/1
650 SUPPOSITORY RECTAL EVERY 6 HOURS PRN
Status: DISCONTINUED | OUTPATIENT
Start: 2024-02-20 | End: 2024-02-23 | Stop reason: HOSPADM

## 2024-02-20 RX ORDER — FINASTERIDE 5 MG/1
5 TABLET, FILM COATED ORAL DAILY
Status: DISCONTINUED | OUTPATIENT
Start: 2024-02-21 | End: 2024-02-23 | Stop reason: HOSPADM

## 2024-02-20 RX ORDER — POLYETHYLENE GLYCOL 3350 17 G/17G
17 POWDER, FOR SOLUTION ORAL DAILY PRN
Status: DISCONTINUED | OUTPATIENT
Start: 2024-02-20 | End: 2024-02-23 | Stop reason: HOSPADM

## 2024-02-20 RX ORDER — SODIUM CHLORIDE 9 MG/ML
INJECTION, SOLUTION INTRAVENOUS PRN
Status: DISCONTINUED | OUTPATIENT
Start: 2024-02-20 | End: 2024-02-23 | Stop reason: HOSPADM

## 2024-02-20 RX ADMIN — IOPAMIDOL 75 ML: 755 INJECTION, SOLUTION INTRAVENOUS at 18:51

## 2024-02-20 RX ADMIN — MORPHINE SULFATE 4 MG: 4 INJECTION, SOLUTION INTRAMUSCULAR; INTRAVENOUS at 18:43

## 2024-02-20 ASSESSMENT — PAIN SCALES - GENERAL
PAINLEVEL_OUTOF10: 8
PAINLEVEL_OUTOF10: 3
PAINLEVEL_OUTOF10: 6

## 2024-02-20 ASSESSMENT — PAIN DESCRIPTION - LOCATION: LOCATION: BACK;RIB CAGE

## 2024-02-20 ASSESSMENT — PAIN - FUNCTIONAL ASSESSMENT: PAIN_FUNCTIONAL_ASSESSMENT: ACTIVITIES ARE NOT PREVENTED

## 2024-02-20 ASSESSMENT — LIFESTYLE VARIABLES
HOW OFTEN DO YOU HAVE A DRINK CONTAINING ALCOHOL: NEVER
HOW MANY STANDARD DRINKS CONTAINING ALCOHOL DO YOU HAVE ON A TYPICAL DAY: PATIENT DOES NOT DRINK

## 2024-02-20 ASSESSMENT — PAIN DESCRIPTION - DESCRIPTORS: DESCRIPTORS: ACHING;SORE

## 2024-02-20 ASSESSMENT — PAIN DESCRIPTION - ORIENTATION: ORIENTATION: RIGHT;LEFT
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
## 2024-02-21 ENCOUNTER — APPOINTMENT (OUTPATIENT)
Dept: MRI IMAGING | Age: 84
DRG: 552 | End: 2024-02-21
Payer: OTHER MISCELLANEOUS

## 2024-02-21 PROBLEM — M54.50 LOWER BACK PAIN: Status: ACTIVE | Noted: 2024-02-21

## 2024-02-21 PROCEDURE — 97535 SELF CARE MNGMENT TRAINING: CPT

## 2024-02-21 PROCEDURE — 97162 PT EVAL MOD COMPLEX 30 MIN: CPT

## 2024-02-21 PROCEDURE — 6370000000 HC RX 637 (ALT 250 FOR IP): Performed by: INTERNAL MEDICINE

## 2024-02-21 PROCEDURE — 2580000003 HC RX 258: Performed by: INTERNAL MEDICINE

## 2024-02-21 PROCEDURE — 97116 GAIT TRAINING THERAPY: CPT

## 2024-02-21 PROCEDURE — 97530 THERAPEUTIC ACTIVITIES: CPT

## 2024-02-21 PROCEDURE — 96372 THER/PROPH/DIAG INJ SC/IM: CPT

## 2024-02-21 PROCEDURE — 72148 MRI LUMBAR SPINE W/O DYE: CPT

## 2024-02-21 PROCEDURE — 6360000002 HC RX W HCPCS: Performed by: INTERNAL MEDICINE

## 2024-02-21 PROCEDURE — G0378 HOSPITAL OBSERVATION PER HR: HCPCS

## 2024-02-21 PROCEDURE — 97166 OT EVAL MOD COMPLEX 45 MIN: CPT

## 2024-02-21 PROCEDURE — 94150 VITAL CAPACITY TEST: CPT

## 2024-02-21 PROCEDURE — 94760 N-INVAS EAR/PLS OXIMETRY 1: CPT

## 2024-02-21 PROCEDURE — 1200000000 HC SEMI PRIVATE

## 2024-02-21 PROCEDURE — 72146 MRI CHEST SPINE W/O DYE: CPT

## 2024-02-21 RX ADMIN — DORZOLAMIDE HYDROCHLORIDE AND TIMOLOL MALEATE 1 DROP: 20; 5 SOLUTION/ DROPS OPHTHALMIC at 09:40

## 2024-02-21 RX ADMIN — FINASTERIDE 5 MG: 5 TABLET, FILM COATED ORAL at 08:45

## 2024-02-21 RX ADMIN — SODIUM CHLORIDE, PRESERVATIVE FREE 10 ML: 5 INJECTION INTRAVENOUS at 08:50

## 2024-02-21 RX ADMIN — DORZOLAMIDE HYDROCHLORIDE AND TIMOLOL MALEATE 1 DROP: 20; 5 SOLUTION/ DROPS OPHTHALMIC at 20:57

## 2024-02-21 RX ADMIN — DOXAZOSIN MESYLATE 4 MG: 4 TABLET ORAL at 00:49

## 2024-02-21 RX ADMIN — ENOXAPARIN SODIUM 40 MG: 100 INJECTION SUBCUTANEOUS at 08:45

## 2024-02-21 RX ADMIN — SODIUM CHLORIDE, PRESERVATIVE FREE 10 ML: 5 INJECTION INTRAVENOUS at 00:13

## 2024-02-21 RX ADMIN — SODIUM CHLORIDE, PRESERVATIVE FREE 10 ML: 5 INJECTION INTRAVENOUS at 20:57

## 2024-02-21 RX ADMIN — DOXAZOSIN MESYLATE 4 MG: 4 TABLET ORAL at 20:57

## 2024-02-21 RX ADMIN — CYCLOBENZAPRINE 10 MG: 10 TABLET, FILM COATED ORAL at 20:57

## 2024-02-21 RX ADMIN — DORZOLAMIDE HYDROCHLORIDE AND TIMOLOL MALEATE 1 DROP: 20; 5 SOLUTION/ DROPS OPHTHALMIC at 00:59

## 2024-02-21 ASSESSMENT — PAIN SCALES - GENERAL
PAINLEVEL_OUTOF10: 3
PAINLEVEL_OUTOF10: 0
PAINLEVEL_OUTOF10: 0

## 2024-02-21 ASSESSMENT — PAIN DESCRIPTION - DESCRIPTORS: DESCRIPTORS: ACHING;SORE;SPASM

## 2024-02-21 ASSESSMENT — PAIN DESCRIPTION - ONSET: ONSET: ON-GOING

## 2024-02-21 ASSESSMENT — PAIN DESCRIPTION - PAIN TYPE: TYPE: ACUTE PAIN

## 2024-02-21 ASSESSMENT — PAIN DESCRIPTION - LOCATION: LOCATION: BACK;RIB CAGE

## 2024-02-21 ASSESSMENT — PAIN DESCRIPTION - ORIENTATION: ORIENTATION: LEFT;RIGHT

## 2024-02-21 ASSESSMENT — PAIN - FUNCTIONAL ASSESSMENT: PAIN_FUNCTIONAL_ASSESSMENT: PREVENTS OR INTERFERES SOME ACTIVE ACTIVITIES AND ADLS

## 2024-02-21 ASSESSMENT — PAIN DESCRIPTION - FREQUENCY: FREQUENCY: CONTINUOUS
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
## 2024-02-21 NOTE — PROGRESS NOTES
4 Eyes Skin Assessment     NAME:  Leeroy Aguirre  YOB: 1940  MEDICAL RECORD NUMBER:  [id number]    The patient is being assessed for  Admission    I agree that at least one RN has performed a thorough Head to Toe Skin Assessment on the patient. ALL assessment sites listed below have been assessed.      Areas assessed by both nurses:    Head, Face, Ears, Shoulders, Back, Chest, Arms, Elbows, Hands, Sacrum. Buttock, Coccyx, Ischium, Legs. Feet and Heels, and Under Medical Devices         Does the Patient have a Wound? No noted wound(s)       Praful Prevention initiated by RN: Yes  Wound Care Orders initiated by RN: No    Pressure Injury (Stage 3,4, Unstageable, DTI, NWPT, and Complex wounds) if present, place Wound referral order by RN under : No    New Ostomies, if present place, Ostomy referral order under : No     Nurse 1 eSignature: Electronically signed by Tamara Bowen RN on 2/21/24 at 2:55 AM EST    **SHARE this note so that the co-signing nurse can place an eSignature**    Nurse 2 eSignature: Electronically signed by Shai Padilla RN on 2/21/24 at 3:08 AM EST

## 2024-02-21 NOTE — PROGRESS NOTES
Patient off floor, MRI.    Alert and oriented to person, place and time, memory intact, behavior appropriate to situation, PERRL.

## 2024-02-21 NOTE — PLAN OF CARE
Problem: Discharge Planning  Goal: Discharge to home or other facility with appropriate resources  2/21/2024 1448 by Elham Peguero RN  Outcome: Progressing  2/21/2024 0256 by Tamara Bowen RN  Outcome: Progressing  Flowsheets (Taken 2/21/2024 0256)  Discharge to home or other facility with appropriate resources:   Identify barriers to discharge with patient and caregiver   Identify discharge learning needs (meds, wound care, etc)   Arrange for needed discharge resources and transportation as appropriate     Problem: Pain  Goal: Verbalizes/displays adequate comfort level or baseline comfort level  2/21/2024 1448 by Elham Peguero RN  Outcome: Progressing  2/21/2024 0256 by Tamara Bowen RN  Outcome: Progressing  Flowsheets (Taken 2/21/2024 0256)  Verbalizes/displays adequate comfort level or baseline comfort level:   Encourage patient to monitor pain and request assistance   Administer analgesics based on type and severity of pain and evaluate response   Assess pain using appropriate pain scale   Implement non-pharmacological measures as appropriate and evaluate response     Problem: Safety - Adult  Goal: Free from fall injury  2/21/2024 1448 by Elham Peguero RN  Outcome: Progressing  2/21/2024 0256 by Tamara Bowen RN  Outcome: Progressing  Flowsheets (Taken 2/21/2024 0256)  Free From Fall Injury: Instruct family/caregiver on patient safety     Problem: ABCDS Injury Assessment  Goal: Absence of physical injury  2/21/2024 1448 by Elham Peguero RN  Outcome: Progressing  2/21/2024 0256 by Tamara Bowen RN  Outcome: Progressing  Flowsheets (Taken 2/21/2024 0256)  Absence of Physical Injury: Implement safety measures based on patient assessment     Problem: Skin/Tissue Integrity  Goal: Absence of new skin breakdown  Description: 1.  Monitor for areas of redness and/or skin breakdown  2.  Assess vascular access sites hourly  3.  Every 4-6 hours minimum:  Change oxygen saturation probe site  4.

## 2024-02-21 NOTE — PROGRESS NOTES
Patient was admitted at 0000 to room 3130 via stretcher. Pt oriented to room, call light, policies and procedures, the menu and ordering. Call light within reach. Bed in lowest position, bed alarm on, and wheels locked. Pt verbalized understanding. No complaints, questions, or concerns at this time.
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
## 2024-02-21 NOTE — PROGRESS NOTES
[facility] called regarding order for TLSO brace. Information provided and face sheet/order faxed per request. Consult paced for neurosurgery as ordered. PT/OT notified of order and will follow up once equipment delivered to work with patient. Electronically signed by Elana Pacheco RN on 2/21/2024 at 10:28 AM
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
## 2024-02-21 NOTE — PLAN OF CARE
Problem: Discharge Planning  Goal: Discharge to home or other facility with appropriate resources  Outcome: Progressing  Flowsheets (Taken 2/21/2024 0256)  Discharge to home or other facility with appropriate resources:   Identify barriers to discharge with patient and caregiver   Identify discharge learning needs (meds, wound care, etc)   Arrange for needed discharge resources and transportation as appropriate     Problem: Pain  Goal: Verbalizes/displays adequate comfort level or baseline comfort level  Outcome: Progressing  Flowsheets (Taken 2/21/2024 0256)  Verbalizes/displays adequate comfort level or baseline comfort level:   Encourage patient to monitor pain and request assistance   Administer analgesics based on type and severity of pain and evaluate response   Assess pain using appropriate pain scale   Implement non-pharmacological measures as appropriate and evaluate response     Problem: Safety - Adult  Goal: Free from fall injury  Outcome: Progressing  Flowsheets (Taken 2/21/2024 0256)  Free From Fall Injury: Instruct family/caregiver on patient safety     Problem: ABCDS Injury Assessment  Goal: Absence of physical injury  Outcome: Progressing  Flowsheets (Taken 2/21/2024 0256)  Absence of Physical Injury: Implement safety measures based on patient assessment     Problem: Skin/Tissue Integrity  Goal: Absence of new skin breakdown  Description: 1.  Monitor for areas of redness and/or skin breakdown  2.  Assess vascular access sites hourly  3.  Every 4-6 hours minimum:  Change oxygen saturation probe site  4.  Every 4-6 hours:  If on nasal continuous positive airway pressure, respiratory therapy assess nares and determine need for appliance change or resting period.  Outcome: Progressing   Electronically signed by Tamara Bowen RN on 2/21/2024 at 2:56 AM

## 2024-02-21 NOTE — CARE COORDINATION
02/21/24 1534   Service Assessment   Patient Orientation Alert and Oriented;Person;Place;Situation   Cognition Alert   History Provided By Patient   Primary Caregiver Self   Accompanied By/Relationship daughter   Support Systems Spouse/Significant Other;Children   Patient's Healthcare Decision Maker is: Legal Next of Kin   PCP Verified by CM Yes   Last Visit to PCP Within last 6 months   Prior Functional Level Independent in ADLs/IADLs   Current Functional Level Assistance with the following:;Bathing;Dressing;Toileting;Mobility;Housework   Can patient return to prior living arrangement Unknown at present   Ability to make needs known: Good   Family able to assist with home care needs: Other (comment)  (unknown at present- pt's wife is also in the hospital)   Would you like for me to discuss the discharge plan with any other family members/significant others, and if so, who? Yes  (daughter at bedside)   Financial Resources Medicare   Community Resources None   CM/VARINDER Referral Other (see comment)  (dc needs)   Discharge Planning   Type of Residence House   Living Arrangements Spouse/Significant Other   Current Services Prior To Admission Durable Medical Equipment   Current DME Prior to Arrival Walker;Other (Comment)  (grab [id number]-long handled shoe horn-reacher)   Potential Assistance Needed Home Care;Skilled Nursing Facility;Other (Comment)  (rehab)   DME Ordered? No   Potential Assistance Purchasing Medications No   Type of Home Care Services None   Patient expects to be discharged to: Unknown   Services At/After Discharge   [facility] Resource Information Provided? No   Mode of Transport at Discharge Other (see comment)  (unknown at present)   Condition of Participation: Discharge Planning   The Plan for Transition of Care is related to the following treatment goals: skilled acilities nad home care   The Patient and/or Patient Representative was provided with a Choice of Provider? Patient   The Patient  [id number]-long handled shoe horn-reacher)            Type of Home Care services:  (P) None    ADLS  Prior functional level: (P) Independent in ADLs/IADLs  Current functional level: (P) Assistance with the following:, Bathing, Dressing, Toileting, Mobility, Housework    PT AM-PAC: 17 /24  OT AM-PAC: 17 /24    Family can provide assistance at DC: (P) Other (comment) (unknown at present- pt's wife is also in the hospital)  Would you like Case Management to discuss the discharge plan with any other family members/significant others, and if so, who? (P) Yes (daughter at bedside)  Plans to Return to Present Housing: (P) Unknown at present  Other Identified Issues/Barriers to RETURNING to current housing: mobility  Potential Assistance needed at discharge: (P) Home Care, Skilled Nursing Facility, Other (Comment) (rehab)            Potential DME:  unknown   Patient expects to discharge to: (P) Unknown  Plan for transportation at discharge:  unknown    Financial    Payor: GENERIC AUTO INSURANCE / Plan: GENERIC AUTO INSURANCE / Product Type: *No Product type* /     Does insurance require precert for SNF: No    Potential assistance Purchasing Medications: (P) No  Meds-to-Beds request: Yes      [facility] [address] [facility] ([facility] [place] - [address] - P [phone] - F [phone]  [address]  [place] [place] [address]  Phone: [phone] Fax: [phone]      Notes:    Factors facilitating achievement of predicted outcomes: Family support, Motivated, Cooperative, and Pleasant    Barriers to discharge: mobility    Additional Case Management Notes:   Met with patient and her daughter at bedside   Pt lives at home with his wife - they live in a house w/ 4STE  Pt independent with his care  We discuss DC needs- pt is unsure of his DC needs  Awaiting MRI  He is hopeful to return home- provided home care list   We also discussed possible need for continued therapy in a facility- He states he

## 2024-02-21 NOTE — PROGRESS NOTES
Patient in bed with eyes open, 2/4 side rails up, bed in lowest position and call light within reach. Patient given morning medications with no complications and has no complaints at this time.     Electronically signed by Elham Peguero RN on 2/21/2024 at 9:37 AM

## 2024-02-21 NOTE — ACP (ADVANCE CARE PLANNING)
Advance Care Planning     Advance Care Planning Inpatient Note  [facility] Department    Today's Date: 2/21/2024  Unit: [facility]TH[facility]    Received request from family.  Upon review of chart and communication with care team, patient's decision making abilities are not in question.. Patient and Child/Children was/were present in the room during visit.    Goals of ACP Conversation:  Discuss advance care planning documents    Health Care Decision Makers:     No healthcare decision makers have been documented.  Click here to complete HealthCare Decision Makers including selection of the Healthcare Decision Maker Relationship (ie \"Primary\")  Summary:  No Decision Maker named by patient at this time    Advance Care Planning Documents (Patient Wishes):  None     Assessment:  Pt doesn't have a HCPOA or LW on file in Epic and it doesn't appear he has completed one before. Pt would like to review the AD docs before completing them.     Interventions:  Provided education on documents for clarity and greater understanding  Discussed and provided education on state decision maker hierarchy  Encouraged ongoing ACP conversation with future decision makers and loved ones  Reviewed but did not complete ACP document    Care Preferences Communicated:   No    Outcomes/Plan:  ACP Discussion: Postponed  Pt to follow-up    Electronically signed by Chaplain Gael on 2/21/2024 at 11:53 AM

## 2024-02-22 LAB
ALBUMIN SERPL-MCNC: 3.9 G/DL (ref 3.4–5)
ALBUMIN/GLOB SERPL: 1.5 {RATIO} (ref 1.1–2.2)
ALP SERPL-CCNC: 66 U/L (ref 40–129)
ALT SERPL-CCNC: 12 U/L (ref 10–40)
ANION GAP SERPL CALCULATED.3IONS-SCNC: 10 MMOL/L (ref 3–16)
AST SERPL-CCNC: 18 U/L (ref 15–37)
BASOPHILS # BLD: 0 K/UL (ref 0–0.2)
BASOPHILS NFR BLD: 0.3 %
BILIRUB SERPL-MCNC: 0.9 MG/DL (ref 0–1)
BUN SERPL-MCNC: 16 MG/DL (ref 7–20)
CALCIUM SERPL-MCNC: 9 MG/DL (ref 8.3–10.6)
CHLORIDE SERPL-SCNC: 101 MMOL/L (ref 99–110)
CO2 SERPL-SCNC: 25 MMOL/L (ref 21–32)
CREAT SERPL-MCNC: 0.8 MG/DL (ref 0.8–1.3)
DEPRECATED RDW RBC AUTO: 14.4 % (ref 12.4–15.4)
EOSINOPHIL # BLD: 0.4 K/UL (ref 0–0.6)
EOSINOPHIL NFR BLD: 5.2 %
GFR SERPLBLD CREATININE-BSD FMLA CKD-EPI: >60 ML/MIN/{1.73_M2}
GLUCOSE SERPL-MCNC: 100 MG/DL (ref 70–99)
HCT VFR BLD AUTO: 34.8 % (ref 40.5–52.5)
HGB BLD-MCNC: 12.3 G/DL (ref 13.5–17.5)
LYMPHOCYTES # BLD: 0.8 K/UL (ref 1–5.1)
LYMPHOCYTES NFR BLD: 12.3 %
MAGNESIUM SERPL-MCNC: 2 MG/DL (ref 1.8–2.4)
MCH RBC QN AUTO: 32.7 PG (ref 26–34)
MCHC RBC AUTO-ENTMCNC: 35.2 G/DL (ref 31–36)
MCV RBC AUTO: 92.8 FL (ref 80–100)
MONOCYTES # BLD: 0.6 K/UL (ref 0–1.3)
MONOCYTES NFR BLD: 9.1 %
NEUTROPHILS # BLD: 5 K/UL (ref 1.7–7.7)
NEUTROPHILS NFR BLD: 73.1 %
PHOSPHATE SERPL-MCNC: 3.3 MG/DL (ref 2.5–4.9)
PLATELET # BLD AUTO: 147 K/UL (ref 135–450)
PMV BLD AUTO: 7.8 FL (ref 5–10.5)
POTASSIUM SERPL-SCNC: 3.5 MMOL/L (ref 3.5–5.1)
PROT SERPL-MCNC: 6.5 G/DL (ref 6.4–8.2)
RBC # BLD AUTO: 3.75 M/UL (ref 4.2–5.9)
SODIUM SERPL-SCNC: 136 MMOL/L (ref 136–145)
WBC # BLD AUTO: 6.8 K/UL (ref 4–11)

## 2024-02-22 PROCEDURE — 1200000000 HC SEMI PRIVATE

## 2024-02-22 PROCEDURE — 85025 COMPLETE CBC W/AUTO DIFF WBC: CPT

## 2024-02-22 PROCEDURE — 97530 THERAPEUTIC ACTIVITIES: CPT

## 2024-02-22 PROCEDURE — 6360000002 HC RX W HCPCS: Performed by: INTERNAL MEDICINE

## 2024-02-22 PROCEDURE — 97110 THERAPEUTIC EXERCISES: CPT

## 2024-02-22 PROCEDURE — 84100 ASSAY OF PHOSPHORUS: CPT

## 2024-02-22 PROCEDURE — 2580000003 HC RX 258: Performed by: INTERNAL MEDICINE

## 2024-02-22 PROCEDURE — 97116 GAIT TRAINING THERAPY: CPT

## 2024-02-22 PROCEDURE — 83735 ASSAY OF MAGNESIUM: CPT

## 2024-02-22 PROCEDURE — 36415 COLL VENOUS BLD VENIPUNCTURE: CPT

## 2024-02-22 PROCEDURE — 6370000000 HC RX 637 (ALT 250 FOR IP): Performed by: INTERNAL MEDICINE

## 2024-02-22 PROCEDURE — 80053 COMPREHEN METABOLIC PANEL: CPT

## 2024-02-22 RX ADMIN — CYCLOBENZAPRINE 10 MG: 10 TABLET, FILM COATED ORAL at 21:45

## 2024-02-22 RX ADMIN — ENOXAPARIN SODIUM 40 MG: 100 INJECTION SUBCUTANEOUS at 09:18

## 2024-02-22 RX ADMIN — DORZOLAMIDE HYDROCHLORIDE AND TIMOLOL MALEATE 1 DROP: 20; 5 SOLUTION/ DROPS OPHTHALMIC at 20:49

## 2024-02-22 RX ADMIN — DOXAZOSIN MESYLATE 4 MG: 4 TABLET ORAL at 20:49

## 2024-02-22 RX ADMIN — SODIUM CHLORIDE, PRESERVATIVE FREE 10 ML: 5 INJECTION INTRAVENOUS at 20:53

## 2024-02-22 RX ADMIN — FINASTERIDE 5 MG: 5 TABLET, FILM COATED ORAL at 09:19

## 2024-02-22 RX ADMIN — SODIUM CHLORIDE, PRESERVATIVE FREE 10 ML: 5 INJECTION INTRAVENOUS at 09:19

## 2024-02-22 RX ADMIN — DORZOLAMIDE HYDROCHLORIDE AND TIMOLOL MALEATE 1 DROP: 20; 5 SOLUTION/ DROPS OPHTHALMIC at 09:19

## 2024-02-22 ASSESSMENT — ENCOUNTER SYMPTOMS
SHORTNESS OF BREATH: 0
BACK PAIN: 1

## 2024-02-22 NOTE — PROGRESS NOTES
Patient is A&Ox4. Patient is resting in bed, awake and quiet. Room air. Side rails are up x3. Fall precautions are in place. Bed alarm on. Bed is in lowest position. Call light, telephone and bedside table are within reach. VSS taken. AM meds given. Shift assessment completed. Will continue to monitor patient per unit protocols. Electronically signed by Liz Montelongo RN on 2/22/2024 at 11:25 AM

## 2024-02-22 NOTE — PLAN OF CARE
Problem: Discharge Planning  Goal: Discharge to home or other facility with appropriate resources  2/21/2024 2140 by Nayely Dimas RN  Outcome: Progressing  Flowsheets (Taken 2/21/2024 2055)  Discharge to home or other facility with appropriate resources:   Arrange for needed discharge resources and transportation as appropriate   Identify barriers to discharge with patient and caregiver     Problem: Pain  Goal: Verbalizes/displays adequate comfort level or baseline comfort level  2/21/2024 2140 by Nayely Dimas RN  Outcome: Progressing  Flowsheets (Taken 2/21/2024 2055)  Verbalizes/displays adequate comfort level or baseline comfort level:   Encourage patient to monitor pain and request assistance   Assess pain using appropriate pain scale   Administer analgesics based on type and severity of pain and evaluate response   Implement non-pharmacological measures as appropriate and evaluate response     Problem: Safety - Adult  Goal: Free from fall injury  2/21/2024 2140 by Nayely Dimas RN  Outcome: Progressing     Problem: ABCDS Injury Assessment  Goal: Absence of physical injury  2/21/2024 2140 by Nayely Dimas RN  Outcome: Progressing  Flowsheets (Taken 2/21/2024 2133)  Absence of Physical Injury: Implement safety measures based on patient assessment     Problem: Skin/Tissue Integrity  Goal: Absence of new skin breakdown  Description: 1.  Monitor for areas of redness and/or skin breakdown  2.  Assess vascular access sites hourly  3.  Every 4-6 hours minimum:  Change oxygen saturation probe site  4.  Every 4-6 hours:  If on nasal continuous positive airway pressure, respiratory therapy assess nares and determine need for appliance change or resting period.  2/21/2024 2140 by Nayely Dimas RN  Outcome: Progressing
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
## 2024-02-22 NOTE — PLAN OF CARE
Problem: Discharge Planning  Goal: Discharge to home or other facility with appropriate resources  2/22/2024 0727 by Liz Montelongo, RN  Outcome: Progressing   Assessed patients knowledge of discharge.  Will continue to work with patient on discharge planning and answer patient questions. Will consult case management and  as necessary.   Problem: Pain  Goal: Verbalizes/displays adequate comfort level or baseline comfort level  2/22/2024 0727 by Liz Montelongo, RN  Outcome: Progressing   Patient educated on acute pain.  Taught patient to use call light to ask for pain medication.  PRN pain medication given for acute pain.  Will continue to monitor pain per unit protocol.    Problem: Safety - Adult  Goal: Free from fall injury  2/22/2024 0727 by Liz Montelongo, RN  Outcome: Progressing   Will remain free from falls. Fall precautions are in place. Call light, telephone and bedside table are within reach.   Problem: ABCDS Injury Assessment  Goal: Absence of physical injury  2/22/2024 0727 by Liz Montelongo RN  Outcome: Progressing     Problem: Skin/Tissue Integrity  Goal: Absence of new skin breakdown  Description: 1.  Monitor for areas of redness and/or skin breakdown  2.  Assess vascular access sites hourly  3.  Every 4-6 hours minimum:  Change oxygen saturation probe site  4.  Every 4-6 hours:  If on nasal continuous positive airway pressure, respiratory therapy assess nares and determine need for appliance change or resting period.  2/22/2024 0727 by Liz Montelongo, RN  Outcome: Progressing   Will monitor skin and mucous members.  Will turn patient every 2 hours, monitor for friction and sheering, and change dressings as needed.  Will preform skin assessment every shift.
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
## 2024-02-22 NOTE — PROGRESS NOTES
Patient is resting in bed, awake and quiet. Family at bedside. Room air. Side rails are up x3. Fall precautions are in place. Bed alarm on. Bed is in lowest position. Call light, telephone and bedside table are within reach. Patient denies needs at present. Will continue to monitor patient per unit protocols. Electronically signed by iLz Montelongo RN on 2/22/2024 at 4:23 PM
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
## 2024-02-22 NOTE — PROGRESS NOTES
PT AAO x4.  C/o pain/spasms in back and rib cage 3/10 on pain scale.  Medicated with PRN Flexeril.  Assessment completed.  Assisted pt to reposition in bed.  Pt performed IS, encouraged usage.  Lungs clear.  No c/o numbness or tingling in extremities.  Pedal pulses palpable.  Denies any needs.  Call light in reach.  Will monitor.

## 2024-02-22 NOTE — CARE COORDINATION
The Plan for Transition of Care is related to the following treatment goals:     Acute rehab at 5-7 days per week.    The Patient and/or patient representative was provided with a choice of provider and agrees   with the discharge plan. [x] Yes [] No    Freedom of choice list was provided with basic dialogue that supports the patient's individualized plan of care/goals, treatment preferences and shares the quality data associated with the providers. [x] Yes [] No    SW and patient reviewed the freedom of choice list and he advised that he prefers acute rehab at [facility]. SW reached out to MD to find out if she is agreeable. If she is the order will be placed, if not SW will return to bedside to discuss alternatives.     Respectfully submitted,    TONYA Cuba  [facility]   [phone]    Electronically signed by MARIA VICTORIA Reyna, JACIEL on 2/22/2024 at 12:10 PM
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
## 2024-02-23 ENCOUNTER — HOSPITAL ENCOUNTER (INPATIENT)
Age: 84
DRG: 561 | End: 2024-02-23
Attending: PHYSICAL MEDICINE & REHABILITATION | Admitting: PHYSICAL MEDICINE & REHABILITATION
Payer: MEDICARE

## 2024-02-23 VITALS
OXYGEN SATURATION: 92 % | HEART RATE: 83 BPM | BODY MASS INDEX: 27.34 KG/M2 | RESPIRATION RATE: 16 BRPM | WEIGHT: 174.16 LBS | HEIGHT: 67 IN | SYSTOLIC BLOOD PRESSURE: 123 MMHG | DIASTOLIC BLOOD PRESSURE: 84 MMHG | TEMPERATURE: 97.8 F

## 2024-02-23 DIAGNOSIS — S32.020A CLOSED COMPRESSION FRACTURE OF L2 LUMBAR VERTEBRA, INITIAL ENCOUNTER (HCC): Primary | ICD-10-CM

## 2024-02-23 PROCEDURE — 6360000002 HC RX W HCPCS: Performed by: INTERNAL MEDICINE

## 2024-02-23 PROCEDURE — 6370000000 HC RX 637 (ALT 250 FOR IP): Performed by: PHYSICAL MEDICINE & REHABILITATION

## 2024-02-23 PROCEDURE — 94760 N-INVAS EAR/PLS OXIMETRY 1: CPT

## 2024-02-23 PROCEDURE — 51798 US URINE CAPACITY MEASURE: CPT

## 2024-02-23 PROCEDURE — 6370000000 HC RX 637 (ALT 250 FOR IP): Performed by: INTERNAL MEDICINE

## 2024-02-23 PROCEDURE — 97535 SELF CARE MNGMENT TRAINING: CPT

## 2024-02-23 PROCEDURE — 97530 THERAPEUTIC ACTIVITIES: CPT

## 2024-02-23 PROCEDURE — 2580000003 HC RX 258: Performed by: INTERNAL MEDICINE

## 2024-02-23 PROCEDURE — 6360000002 HC RX W HCPCS: Performed by: PHYSICAL MEDICINE & REHABILITATION

## 2024-02-23 PROCEDURE — 97116 GAIT TRAINING THERAPY: CPT

## 2024-02-23 PROCEDURE — 1280000000 HC REHAB R&B

## 2024-02-23 RX ORDER — TRAMADOL HYDROCHLORIDE 50 MG/1
50 TABLET ORAL EVERY 6 HOURS PRN
Status: CANCELLED | OUTPATIENT
Start: 2024-02-23

## 2024-02-23 RX ORDER — BISACODYL 10 MG
10 SUPPOSITORY, RECTAL RECTAL DAILY PRN
Status: DISCONTINUED | OUTPATIENT
Start: 2024-02-23 | End: 2024-03-05 | Stop reason: HOSPADM

## 2024-02-23 RX ORDER — MAGNESIUM HYDROXIDE/ALUMINUM HYDROXICE/SIMETHICONE 120; 1200; 1200 MG/30ML; MG/30ML; MG/30ML
30 SUSPENSION ORAL EVERY 6 HOURS PRN
Status: DISCONTINUED | OUTPATIENT
Start: 2024-02-23 | End: 2024-03-05 | Stop reason: HOSPADM

## 2024-02-23 RX ORDER — ACETAMINOPHEN 325 MG/1
650 TABLET ORAL EVERY 6 HOURS PRN
Status: DISCONTINUED | OUTPATIENT
Start: 2024-02-23 | End: 2024-03-05 | Stop reason: HOSPADM

## 2024-02-23 RX ORDER — HYDRALAZINE HYDROCHLORIDE 10 MG/1
10 TABLET, FILM COATED ORAL EVERY 6 HOURS PRN
Status: DISCONTINUED | OUTPATIENT
Start: 2024-02-23 | End: 2024-03-05 | Stop reason: HOSPADM

## 2024-02-23 RX ORDER — CYCLOBENZAPRINE HCL 10 MG
10 TABLET ORAL 3 TIMES DAILY PRN
Status: CANCELLED | OUTPATIENT
Start: 2024-02-23

## 2024-02-23 RX ORDER — DOXAZOSIN MESYLATE 4 MG/1
4 TABLET ORAL NIGHTLY
Status: CANCELLED | OUTPATIENT
Start: 2024-02-23

## 2024-02-23 RX ORDER — DORZOLAMIDE HYDROCHLORIDE AND TIMOLOL MALEATE 20; 5 MG/ML; MG/ML
1 SOLUTION/ DROPS OPHTHALMIC 2 TIMES DAILY
Status: DISCONTINUED | OUTPATIENT
Start: 2024-02-23 | End: 2024-03-05 | Stop reason: HOSPADM

## 2024-02-23 RX ORDER — PANTOPRAZOLE SODIUM 40 MG/1
40 TABLET, DELAYED RELEASE ORAL
Status: CANCELLED | OUTPATIENT
Start: 2024-02-24

## 2024-02-23 RX ORDER — FINASTERIDE 5 MG/1
5 TABLET, FILM COATED ORAL DAILY
Status: CANCELLED | OUTPATIENT
Start: 2024-02-24

## 2024-02-23 RX ORDER — TRAMADOL HYDROCHLORIDE 50 MG/1
50 TABLET ORAL EVERY 6 HOURS PRN
Status: DISCONTINUED | OUTPATIENT
Start: 2024-02-23 | End: 2024-03-01

## 2024-02-23 RX ORDER — ACETAMINOPHEN 325 MG/1
650 TABLET ORAL EVERY 6 HOURS PRN
Status: CANCELLED | OUTPATIENT
Start: 2024-02-23

## 2024-02-23 RX ORDER — POLYETHYLENE GLYCOL 3350 17 G/17G
17 POWDER, FOR SOLUTION ORAL DAILY PRN
Status: CANCELLED | OUTPATIENT
Start: 2024-02-23

## 2024-02-23 RX ORDER — SENNA AND DOCUSATE SODIUM 50; 8.6 MG/1; MG/1
1 TABLET, FILM COATED ORAL 2 TIMES DAILY
Status: DISCONTINUED | OUTPATIENT
Start: 2024-02-23 | End: 2024-03-05 | Stop reason: HOSPADM

## 2024-02-23 RX ORDER — MAGNESIUM HYDROXIDE/ALUMINUM HYDROXICE/SIMETHICONE 120; 1200; 1200 MG/30ML; MG/30ML; MG/30ML
30 SUSPENSION ORAL EVERY 6 HOURS PRN
Status: CANCELLED | OUTPATIENT
Start: 2024-02-23

## 2024-02-23 RX ORDER — POLYETHYLENE GLYCOL 3350 17 G/17G
17 POWDER, FOR SOLUTION ORAL DAILY PRN
Status: DISCONTINUED | OUTPATIENT
Start: 2024-02-23 | End: 2024-03-05 | Stop reason: HOSPADM

## 2024-02-23 RX ORDER — ONDANSETRON 2 MG/ML
4 INJECTION INTRAMUSCULAR; INTRAVENOUS EVERY 6 HOURS PRN
Status: CANCELLED | OUTPATIENT
Start: 2024-02-23

## 2024-02-23 RX ORDER — PANTOPRAZOLE SODIUM 40 MG/1
40 TABLET, DELAYED RELEASE ORAL
Status: DISCONTINUED | OUTPATIENT
Start: 2024-02-24 | End: 2024-02-26

## 2024-02-23 RX ORDER — CYCLOBENZAPRINE HCL 10 MG
10 TABLET ORAL 3 TIMES DAILY PRN
Status: DISCONTINUED | OUTPATIENT
Start: 2024-02-23 | End: 2024-03-05 | Stop reason: HOSPADM

## 2024-02-23 RX ORDER — FINASTERIDE 5 MG/1
5 TABLET, FILM COATED ORAL DAILY
Status: DISCONTINUED | OUTPATIENT
Start: 2024-02-24 | End: 2024-02-26

## 2024-02-23 RX ORDER — LIDOCAINE 4 G/G
1 PATCH TOPICAL DAILY
Status: DISCONTINUED | OUTPATIENT
Start: 2024-02-24 | End: 2024-03-05 | Stop reason: HOSPADM

## 2024-02-23 RX ORDER — HYDRALAZINE HYDROCHLORIDE 10 MG/1
10 TABLET, FILM COATED ORAL EVERY 6 HOURS PRN
Status: CANCELLED | OUTPATIENT
Start: 2024-02-23

## 2024-02-23 RX ORDER — ENOXAPARIN SODIUM 100 MG/ML
40 INJECTION SUBCUTANEOUS DAILY
Status: DISCONTINUED | OUTPATIENT
Start: 2024-02-24 | End: 2024-03-05 | Stop reason: HOSPADM

## 2024-02-23 RX ORDER — ONDANSETRON 4 MG/1
4 TABLET, ORALLY DISINTEGRATING ORAL EVERY 8 HOURS PRN
Status: DISCONTINUED | OUTPATIENT
Start: 2024-02-23 | End: 2024-03-05 | Stop reason: HOSPADM

## 2024-02-23 RX ORDER — ENOXAPARIN SODIUM 100 MG/ML
40 INJECTION SUBCUTANEOUS DAILY
Status: CANCELLED | OUTPATIENT
Start: 2024-02-24

## 2024-02-23 RX ORDER — LANOLIN ALCOHOL/MO/W.PET/CERES
3 CREAM (GRAM) TOPICAL NIGHTLY PRN
Status: CANCELLED | OUTPATIENT
Start: 2024-02-23

## 2024-02-23 RX ORDER — ONDANSETRON 4 MG/1
4 TABLET, ORALLY DISINTEGRATING ORAL EVERY 8 HOURS PRN
Status: CANCELLED | OUTPATIENT
Start: 2024-02-23

## 2024-02-23 RX ORDER — LANOLIN ALCOHOL/MO/W.PET/CERES
3 CREAM (GRAM) TOPICAL NIGHTLY PRN
Status: DISCONTINUED | OUTPATIENT
Start: 2024-02-23 | End: 2024-03-05 | Stop reason: HOSPADM

## 2024-02-23 RX ORDER — BISACODYL 10 MG
10 SUPPOSITORY, RECTAL RECTAL DAILY PRN
Status: CANCELLED | OUTPATIENT
Start: 2024-02-23

## 2024-02-23 RX ORDER — SENNA AND DOCUSATE SODIUM 50; 8.6 MG/1; MG/1
1 TABLET, FILM COATED ORAL 2 TIMES DAILY
Status: CANCELLED | OUTPATIENT
Start: 2024-02-23

## 2024-02-23 RX ORDER — DORZOLAMIDE HYDROCHLORIDE AND TIMOLOL MALEATE 20; 5 MG/ML; MG/ML
1 SOLUTION/ DROPS OPHTHALMIC 2 TIMES DAILY
Status: CANCELLED | OUTPATIENT
Start: 2024-02-23

## 2024-02-23 RX ORDER — DOXAZOSIN MESYLATE 4 MG/1
4 TABLET ORAL NIGHTLY
Status: DISCONTINUED | OUTPATIENT
Start: 2024-02-23 | End: 2024-02-26

## 2024-02-23 RX ORDER — ONDANSETRON 2 MG/ML
4 INJECTION INTRAMUSCULAR; INTRAVENOUS EVERY 6 HOURS PRN
Status: DISCONTINUED | OUTPATIENT
Start: 2024-02-23 | End: 2024-03-05 | Stop reason: HOSPADM

## 2024-02-23 RX ORDER — LIDOCAINE 4 G/G
1 PATCH TOPICAL DAILY
Status: CANCELLED | OUTPATIENT
Start: 2024-02-24

## 2024-02-23 RX ADMIN — ONDANSETRON 4 MG: 2 INJECTION INTRAMUSCULAR; INTRAVENOUS at 21:37

## 2024-02-23 RX ADMIN — FINASTERIDE 5 MG: 5 TABLET, FILM COATED ORAL at 08:34

## 2024-02-23 RX ADMIN — DORZOLAMIDE HYDROCHLORIDE AND TIMOLOL MALEATE 1 DROP: 20; 5 SOLUTION/ DROPS OPHTHALMIC at 21:34

## 2024-02-23 RX ADMIN — SODIUM CHLORIDE, PRESERVATIVE FREE 10 ML: 5 INJECTION INTRAVENOUS at 08:34

## 2024-02-23 RX ADMIN — SENNOSIDES AND DOCUSATE SODIUM 1 TABLET: 8.6; 5 TABLET ORAL at 20:40

## 2024-02-23 RX ADMIN — DOXAZOSIN MESYLATE 4 MG: 4 TABLET ORAL at 20:00

## 2024-02-23 RX ADMIN — ONDANSETRON 4 MG: 2 INJECTION INTRAMUSCULAR; INTRAVENOUS at 08:47

## 2024-02-23 RX ADMIN — DORZOLAMIDE HYDROCHLORIDE AND TIMOLOL MALEATE 1 DROP: 20; 5 SOLUTION/ DROPS OPHTHALMIC at 08:33

## 2024-02-23 RX ADMIN — ENOXAPARIN SODIUM 40 MG: 100 INJECTION SUBCUTANEOUS at 08:33

## 2024-02-23 ASSESSMENT — PAIN SCALES - GENERAL
PAINLEVEL_OUTOF10: 2
PAINLEVEL_OUTOF10: 10

## 2024-02-23 NOTE — PROGRESS NOTES
osteophyte complex ligamentum flavum redundancy peers result in mild-to-moderate central canal stenosis.  Disc osteophyte complex extends to the inferior neural foramina results in mild left and moderate to marked right neural foraminal stenosis. SOFT TISSUES/RETROPERITONEUM: No paraspinal mass is seen.     Acute compression fractures at the T8 and L2 levels approximately 10-15 % and 5-10% loss of height respectively. Vascular disease. Mild dilatation of the ascending thoracic aorta. Pulmonary nodularity. Diverticulosis without evidence of diverticulitis. Prostatomegaly and findings related to chronic bladder outlet obstruction. Large cortical cyst involving the right kidney. Right nephrolithiasis. Additional findings noted above. RECOMMENDATIONS: 6 mm right solid pulmonary nodule within the upper lobe. Per Fleischner Society Guidelines, recommend a non-contrast Chest CT at 6-12 months. If patient is high risk for malignancy, consider an additional non-contrast Chest CT at 18-24 months. If patient is low risk for malignancy, non-contrast Chest CT at 18-24 months is optional. These guidelines do not apply to immunocompromised patients and patients with cancer. Follow up in patients with significant comorbidities as clinically warranted. For lung cancer screening, adhere to Lung-RADS guidelines. Reference: Radiology. 2017; 284(1):228-43. Right Bosniak I benign renal cyst measuring 10.3 cm. No follow-up imaging is recommended. JACR 2018 Feb; 264-273, Management of the Incidental Renal Mass on CT, [employer] 2021; 814-848, Bosniak Classification of Cystic Renal Masses, Version 2019.     CT LUMBAR SPINE BONY RECONSTRUCTION    Result Date: 2/20/2024  EXAMINATION: CT OF THE LUMBAR SPINE WITHOUT CONTRAST; CT OF THE THORACIC SPINE WITHOUT CONTRAST; CT OF THE CHEST, ABDOMEN, AND PELVIS WITH CONTRAST <Completed T EXAMINATION: CT OF THE LUMBAR SPINE WITHOUT CONTRAST; CT OF THE THORACIC SPINE WITHOUT CONTRAST; CT OF THE  the left maxillary sinus.  Mucosal thickening involving the ethmoid air cells.  Remainder of the imaged paranasal sinuses mastoid air cells are well aerated.  Imaged orbits are normal in appearance. SOFT TISSUES/SKULL: No acute abnormality of the visualized skull or soft tissues. CT cervical spine: BONES/ALIGNMENT: There is normal alignment of the spine. The vertebral body heights are maintained. No osseous destructive lesion is seen. DEGENERATIVE CHANGES: Narrowing of the C5-6 and C6-7 intervertebral disc spaces.  Mild osteophyte formation at multiple levels in the cervical spine. Narrowing of the pre dens space with probable bone on bone of the dens and adjacent anterior arch of C1.  Mild Austedo of the right C1-2 articulation likely related to patient positioning.  Cervical occipital junction is intact. No significant central canal stenosis is seen. SOFT TISSUES/RETROPERITONEUM: Vascular calcifications in the neck.  Remainder of the imaged paraspinous soft tissues are unremarkable in appearance.  Mild scarring in the right lung apex.     Age-related changes the brain findings likely related to microvascular ischemic disease.  No acute intracranial process identified. Spondylosis of the cervical spine.  No fracture.       CBC:   Recent Labs     02/20/24  1813 02/22/24  0436   WBC 5.9 6.8   HGB 13.3* 12.3*    147     BMP:    Recent Labs     02/20/24 1813 02/22/24  0436    136   K 4.5 3.5    101   CO2 24 25   BUN 13 16   CREATININE 0.7* 0.8   GLUCOSE 90 100*     Hepatic:   Recent Labs     02/20/24  1813 02/22/24  0436   AST 34 18   ALT 18 12   BILITOT 0.7 0.9   ALKPHOS 74 66     Lipids: No results found for: \"CHOL\", \"HDL\", \"TRIG\"  Hemoglobin A1C: No results found for: \"LABA1C\"  TSH: No results found for: \"TSH\"  Troponin: No results found for: \"TROPONINT\"  Lactic Acid: No results for input(s): \"LACTA\" in the last 72 hours.  BNP: No results for input(s): \"PROBNP\" in the last 72 hours.  UA:No

## 2024-02-23 NOTE — PROGRESS NOTES
Patient is A&Ox4 with intermittent confusion. Patient is resting in bed, awake and quiet. Room air. Side rails are up x3. Fall precautions are in place. Bed alarm on. Bed is in lowest position. Call light, telephone and bedside table are within reach. VSS taken. AM meds given. Shift assessment completed. Will continue to monitor patient per unit protocols. Will continue to monitor patient per unit protocols. Electronically signed by Liz Montelongo RN on 2/23/2024 at 9:09 AM

## 2024-02-23 NOTE — PROGRESS NOTES
Patient is in bed, quiet, with eyes closed. Respirations are even and unlabored. Bed is locked in lowest position with bed alarm on. Bedside table and call light are within reach. Patient is able to communicate needs.
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
## 2024-02-23 NOTE — PLAN OF CARE
Problem: Discharge Planning  Goal: Discharge to home or other facility with appropriate resources  2/22/2024 2059 by Sabine Garcia RN  Outcome: Progressing  Flowsheets (Taken 2/21/2024 2055 by Nayely Dimas, RN)  Discharge to home or other facility with appropriate resources:   Arrange for needed discharge resources and transportation as appropriate   Identify barriers to discharge with patient and caregiver  2/22/2024 0727 by Liz Montelongo RN  Outcome: Progressing     Problem: Pain  Goal: Verbalizes/displays adequate comfort level or baseline comfort level  2/22/2024 2059 by Sabine Garcia RN  Outcome: Progressing  Flowsheets (Taken 2/21/2024 2055 by Nayely Dimas RN)  Verbalizes/displays adequate comfort level or baseline comfort level:   Encourage patient to monitor pain and request assistance   Assess pain using appropriate pain scale   Administer analgesics based on type and severity of pain and evaluate response   Implement non-pharmacological measures as appropriate and evaluate response  2/22/2024 0727 by Liz Montelongo RN  Outcome: Progressing     Problem: Safety - Adult  Goal: Free from fall injury  2/22/2024 2059 by Sabine Garcia RN  Outcome: Progressing  Flowsheets (Taken 2/21/2024 0256 by Tamara Bowen, RN)  Free From Fall Injury: Instruct family/caregiver on patient safety  2/22/2024 0727 by Liz Montelongo RN  Outcome: Progressing     Problem: ABCDS Injury Assessment  Goal: Absence of physical injury  2/22/2024 2059 by Sabine Garcia RN  Outcome: Progressing  Flowsheets (Taken 2/21/2024 2133 by Nayely Dimas, RN)  Absence of Physical Injury: Implement safety measures based on patient assessment  2/22/2024 0727 by Liz Montelongo RN  Outcome: Progressing     Problem: Skin/Tissue Integrity  Goal: Absence of new skin breakdown  Description: 1.  Monitor for areas of redness and/or skin breakdown  2.  Assess vascular access sites hourly  3.  Every 4-6 hours minimum:  Change  oxygen saturation probe site  4.  Every 4-6 hours:  If on nasal continuous positive airway pressure, respiratory therapy assess nares and determine need for appliance change or resting period.  2/22/2024 2059 by Sabine Garcia, RN  Outcome: Progressing  2/22/2024 0727 by Liz Montelongo, RN  Outcome: Progressing

## 2024-02-23 NOTE — DISCHARGE INSTR - COC
Continuity of Care Form    Patient Name: Leeroy Aguirre   :  1940  MRN:  [id number]    Admit date:  2024  Discharge date:  ***    Code Status Order: DNR-CCA   Advance Directives:     Admitting Physician:  Jace Rodriguez MD  PCP: Samuel David MD    Discharging Nurse: ***  Discharging Hospital Unit/Room#: J4N-5971/3130-01  Discharging Unit Phone Number: ***    Emergency Contact:   Extended Emergency Contact Information  Primary Emergency Contact: Tyson Aguirre  Address: [address] [place] [address]            [place] [address] [facility]  Home Phone: [phone]  Mobile Phone: [phone]  Relation: Spouse  Secondary Emergency Contact: Donna Thorpe  Home Phone: [phone]  Relation: Child    Past Surgical History:  Past Surgical History:   Procedure Laterality Date    CATARACT REMOVAL      left eye    CATARACT REMOVAL WITH IMPLANT Bilateral     COLONOSCOPY  2017    Dr. Conrad with multiple polypectomies    EYE SURGERY Left     FINGER SURGERY      FOOT NEUROMA SURGERY      left foot    FOOT SURGERY  2012    EXCISION NEUROMA LEFT 3RD INTERSPACE      TONSILLECTOMY      UPPER GASTROINTESTINAL ENDOSCOPY  2021    Dr. Isma Conrad    UPPER GASTROINTESTINAL ENDOSCOPY N/A 2021    ESOPHAGOGASTRODUODENOSCOPY performed by Isma Conrad MD at [facility] ENDOSCOPY       Immunization History:   Immunization History   Administered Date(s) Administered    COVID-19, MODERNA, (- formula), (age 12y+), IM, 50mcg/0.5mL 2023    COVID-19, PFIZER PURPLE top, DILUTE for use, (age 12 y+), 30mcg/0.3mL 2021, 2021, 2021       Active Problems:  Patient Active Problem List   Diagnosis Code    Rotator cuff (capsule) sprain S43.429A    Compression fracture of thoracic vertebra, unspecified thoracic vertebral level, initial encounter ([facility]) S22.000A    Gastroesophageal reflux disease without esophagitis K21.9    RAHEEM (obstructive sleep apnea) G47.33

## 2024-02-23 NOTE — PLAN OF CARE
Problem: Discharge Planning  Goal: Discharge to home or other facility with appropriate resources  2/23/2024 0747 by Liz Montelongo, RN  Outcome: Progressing   Assessed patients knowledge of discharge.  Will continue to work with patient on discharge planning and answer patient questions. Will consult case management and  as necessary.   Problem: Pain  Goal: Verbalizes/displays adequate comfort level or baseline comfort level  2/23/2024 0747 by Liz Montelongo, RN  Outcome: Progressing   Patient educated on acute pain.  Taught patient to use call light to ask for pain medication.  PRN pain medication given for acute pain.  Will continue to monitor pain per unit protocol.    Problem: Safety - Adult  Goal: Free from fall injury  2/23/2024 0747 by Liz Montelongo, RN  Outcome: Progressing   Will remain free from falls. Fall precautions are in place. Call light, telephone and bedside table are within reach.   Problem: ABCDS Injury Assessment  Goal: Absence of physical injury  2/23/2024 0747 by Liz Montelongo RN  Outcome: Progressing     Problem: Skin/Tissue Integrity  Goal: Absence of new skin breakdown  Description: 1.  Monitor for areas of redness and/or skin breakdown  2.  Assess vascular access sites hourly  3.  Every 4-6 hours minimum:  Change oxygen saturation probe site  4.  Every 4-6 hours:  If on nasal continuous positive airway pressure, respiratory therapy assess nares and determine need for appliance change or resting period.  2/23/2024 0747 by Liz Montelongo, RN  Outcome: Progressing   Will monitor skin and mucous members.  Will turn patient every 2 hours, monitor for friction and sheering, and change dressings as needed.  Will preform skin assessment every shift.

## 2024-02-23 NOTE — PROGRESS NOTES
Patient is leaving for discharge to ARU. This RN called JUNG Toussaint to give report. No further questions at this time. Patient being transferred to [facility] with belongings and discharge instructions. Electronically signed by Liz Montelongo RN on 2/23/2024 at 4:32 PM
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
## 2024-02-23 NOTE — CARE COORDINATION
Spoke with patient and family present in room. Plan is for acute rehab at [facility].   Per care rounds, kyphoplasty is not planned at this time. Left message for Betsy on acute rehab.    Electronically signed by MARIA VICTORIA Moreno, LISW, Case Management on 2/23/2024 at 11:58 AM  [place] [phone]
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
## 2024-02-24 LAB
ANION GAP SERPL CALCULATED.3IONS-SCNC: 8 MMOL/L (ref 3–16)
BASOPHILS # BLD: 0 K/UL (ref 0–0.2)
BASOPHILS NFR BLD: 0.3 %
BUN SERPL-MCNC: 17 MG/DL (ref 7–20)
CALCIUM SERPL-MCNC: 9.2 MG/DL (ref 8.3–10.6)
CHLORIDE SERPL-SCNC: 102 MMOL/L (ref 99–110)
CO2 SERPL-SCNC: 30 MMOL/L (ref 21–32)
CREAT SERPL-MCNC: 0.7 MG/DL (ref 0.8–1.3)
DEPRECATED RDW RBC AUTO: 13.5 % (ref 12.4–15.4)
EOSINOPHIL # BLD: 0.3 K/UL (ref 0–0.6)
EOSINOPHIL NFR BLD: 4.2 %
GFR SERPLBLD CREATININE-BSD FMLA CKD-EPI: >60 ML/MIN/{1.73_M2}
GLUCOSE SERPL-MCNC: 99 MG/DL (ref 70–99)
HCT VFR BLD AUTO: 36.9 % (ref 40.5–52.5)
HGB BLD-MCNC: 12.9 G/DL (ref 13.5–17.5)
LYMPHOCYTES # BLD: 0.9 K/UL (ref 1–5.1)
LYMPHOCYTES NFR BLD: 12.9 %
MCH RBC QN AUTO: 32.4 PG (ref 26–34)
MCHC RBC AUTO-ENTMCNC: 35 G/DL (ref 31–36)
MCV RBC AUTO: 92.7 FL (ref 80–100)
MONOCYTES # BLD: 0.8 K/UL (ref 0–1.3)
MONOCYTES NFR BLD: 11.8 %
NEUTROPHILS # BLD: 4.9 K/UL (ref 1.7–7.7)
NEUTROPHILS NFR BLD: 70.8 %
PLATELET # BLD AUTO: 149 K/UL (ref 135–450)
PMV BLD AUTO: 8 FL (ref 5–10.5)
POTASSIUM SERPL-SCNC: 3.8 MMOL/L (ref 3.5–5.1)
PREALB SERPL-MCNC: 15.4 MG/DL (ref 20–40)
RBC # BLD AUTO: 3.99 M/UL (ref 4.2–5.9)
SODIUM SERPL-SCNC: 140 MMOL/L (ref 136–145)
WBC # BLD AUTO: 7 K/UL (ref 4–11)

## 2024-02-24 PROCEDURE — 97166 OT EVAL MOD COMPLEX 45 MIN: CPT

## 2024-02-24 PROCEDURE — 97535 SELF CARE MNGMENT TRAINING: CPT

## 2024-02-24 PROCEDURE — 97110 THERAPEUTIC EXERCISES: CPT | Performed by: PHYSICAL THERAPIST

## 2024-02-24 PROCEDURE — 36415 COLL VENOUS BLD VENIPUNCTURE: CPT

## 2024-02-24 PROCEDURE — 51798 US URINE CAPACITY MEASURE: CPT

## 2024-02-24 PROCEDURE — 6370000000 HC RX 637 (ALT 250 FOR IP): Performed by: PHYSICAL MEDICINE & REHABILITATION

## 2024-02-24 PROCEDURE — 97116 GAIT TRAINING THERAPY: CPT | Performed by: PHYSICAL THERAPIST

## 2024-02-24 PROCEDURE — 1280000000 HC REHAB R&B

## 2024-02-24 PROCEDURE — 80048 BASIC METABOLIC PNL TOTAL CA: CPT

## 2024-02-24 PROCEDURE — 85025 COMPLETE CBC W/AUTO DIFF WBC: CPT

## 2024-02-24 PROCEDURE — 97162 PT EVAL MOD COMPLEX 30 MIN: CPT | Performed by: PHYSICAL THERAPIST

## 2024-02-24 PROCEDURE — 94760 N-INVAS EAR/PLS OXIMETRY 1: CPT

## 2024-02-24 PROCEDURE — 84134 ASSAY OF PREALBUMIN: CPT

## 2024-02-24 PROCEDURE — 97530 THERAPEUTIC ACTIVITIES: CPT | Performed by: PHYSICAL THERAPIST

## 2024-02-24 PROCEDURE — 6360000002 HC RX W HCPCS: Performed by: PHYSICAL MEDICINE & REHABILITATION

## 2024-02-24 PROCEDURE — 97530 THERAPEUTIC ACTIVITIES: CPT

## 2024-02-24 RX ADMIN — SENNOSIDES AND DOCUSATE SODIUM 1 TABLET: 8.6; 5 TABLET ORAL at 20:29

## 2024-02-24 RX ADMIN — DORZOLAMIDE HYDROCHLORIDE AND TIMOLOL MALEATE 1 DROP: 20; 5 SOLUTION/ DROPS OPHTHALMIC at 20:30

## 2024-02-24 RX ADMIN — POLYETHYLENE GLYCOL 3350 17 G: 17 POWDER, FOR SOLUTION ORAL at 05:16

## 2024-02-24 RX ADMIN — ENOXAPARIN SODIUM 40 MG: 100 INJECTION SUBCUTANEOUS at 09:56

## 2024-02-24 RX ADMIN — FINASTERIDE 5 MG: 5 TABLET, FILM COATED ORAL at 09:56

## 2024-02-24 RX ADMIN — DOXAZOSIN MESYLATE 4 MG: 4 TABLET ORAL at 20:28

## 2024-02-24 RX ADMIN — SENNOSIDES AND DOCUSATE SODIUM 1 TABLET: 8.6; 5 TABLET ORAL at 09:56

## 2024-02-24 RX ADMIN — DORZOLAMIDE HYDROCHLORIDE AND TIMOLOL MALEATE 1 DROP: 20; 5 SOLUTION/ DROPS OPHTHALMIC at 09:56

## 2024-02-24 ASSESSMENT — PAIN SCALES - GENERAL: PAINLEVEL_OUTOF10: 0
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
## 2024-02-24 NOTE — PROGRESS NOTES
Patient admitted to rehab with Closed compression fracture of L2 lumbar vertebra.  A/Ox4. Transfers with walker x1, gait belt. Mobility restrictions: TLSO brace when OOB. On Regular MARGARET diet, tolerating well. Medications taken whole with thins. On Lovenox for DVT prophylaxis.  Skin: Ecchymosis scattered, reddened blanchable buttocks. Oxygen: RA. LDA: PIV right hand. Has been continent of bowel and continent of bladder. LBM 2/20. Chair/bed alarms in use and call light in reach. Will monitor for safety. Electronically signed by Lara Gu RN on 2/24/2024 at 11:00 AM
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
## 2024-02-24 NOTE — FLOWSHEET NOTE
Patient admitted to room 3261 per stretcher. Transferred to ARU unit after d/c from ortho. . Patient was oriented to the Call Light, Phone, TV, Thermostat, Bed Controls, Bathroom and Emergency Cord.  Patient verbalized and demonstrated understanding of all.  Patient was also given an over view of Unit Routines for Acute Rehab, including what to wear for therapy. The patient's role in goal setting was reviewed along with an explanation of the Interdisciplinary Team meeting, the 's role in coordinating services and the Discharge Planning/Continuum of Care process. Patient Rights and Responsibilities were reviewed. Meal times were explained, including how to order food.  The white board (used for communication) was pointed out emphasizing  the 3 hours/day Therapy Schedule (posted most evenings), the number (and process) for reporting grievances, and the Doctor's, Nurse's, and PCA's names. It was recommended that any family that will be care givers or any care givers the patient has, take part in therapy. There are no set visiting hours, and it was suggested that non-caregiver friends and family visitors come after therapy (at 4 PM or later) to allow patient to rest in between sessions.

## 2024-02-24 NOTE — PLAN OF CARE
Problem: Discharge Planning  Goal: Discharge to home or other facility with appropriate resources  Outcome: Progressing     Problem: Safety - Adult  Goal: Free from fall injury  Outcome: Progressing  Flowsheets (Taken 2/24/2024 1056)  Free From Fall Injury: Instruct family/caregiver on patient safety     Problem: ABCDS Injury Assessment  Goal: Absence of physical injury  Outcome: Progressing  Flowsheets (Taken 2/24/2024 1056)  Absence of Physical Injury: Implement safety measures based on patient assessment     Problem: Pain  Goal: Verbalizes/displays adequate comfort level or baseline comfort level  Outcome: Progressing

## 2024-02-24 NOTE — PLAN OF CARE
ARU PATIENT TREATMENT PLAN  [facility]   [address] [place]  [address]  [phone]    Leeroy Aguirre    : 1940  [id number]  MRN: [id number]   PHYSICIAN:  Marian Mendoza MD  Primary Problem    Patient Active Problem List   Diagnosis    Rotator cuff (capsule) sprain    Compression fracture of thoracic vertebra, unspecified thoracic vertebral level, initial encounter ([facility])    Gastroesophageal reflux disease without esophagitis    ARHEEM (obstructive sleep apnea)    Hypercholesterolemia    BPH (benign prostatic hyperplasia)    Lower back pain    Closed compression fracture of L2 lumbar vertebra, initial encounter ([facility])       Rehabilitation Diagnosis:     Closed compression fracture of L2 lumbar vertebra, initial encounter ([facility]) [S32.020A]       ADMIT DATE:2024    Patient Goals: \"I want to be able to take care of myself, be able to drive\"     Admitting Impairments: decreased spine ROM, balance      Acute traumatic T8 and L2 compression fractures  -Managed conservatively per Nsgy (Dr. Grace)  -TLSO when OOB  -PT/OT     Systolic murmur  -Per patient has h/o Rheumatic fever     H/o irregular HR per EMR  -PVCs noted on telemetry  -monitor     RAHEEM  -Noted     OA  -pain control  -PT/OT     Bladder: h/o BPH, High risk retention   -Monitor PVRs, SC prn >400cc  -continue finasteride and doxazosin     Barriers: decreased spine ROM, balance, medical comorbidities   Participation: good     CARE PLAN     NURSING:  Leeroy Aguirre while on this unit will:     [] Be continent of bowel and bladder     [x] Have an adequate number of bowel movements  [x] Urinate with no urinary retention >300ml in bladder  [] Complete bladder protocol with lombardi removal  [x] Maintain O2 SATs at 92%  [x] Have pain managed while on ARU       [] Be pain free by discharge   [x] Have no skin breakdown while on ARU  [] Have improved skin integrity via wound measurements  [] Have no

## 2024-02-24 NOTE — FLOWSHEET NOTE
4 Eyes Skin Assessment     NAME:  Leeroy Aguirre  YOB: 1940  MEDICAL RECORD NUMBER:  [id number]    The patient is being assessed for  Admission    I agree that at least one RN has performed a thorough Head to Toe Skin Assessment on the patient. ALL assessment sites listed below have been assessed.      Areas assessed by both nurses:    Head, Face, Ears, Shoulders, Back, Chest, Arms, Elbows, Hands, Sacrum. Buttock, Coccyx, Ischium, and Legs. Feet and Heels. Blanchable redness on sacrum, perirectal area, coccyx and bilateral heels.        Does the Patient have a Wound? No noted wound(s)       Praful Prevention initiated by RN: No  Wound Care Orders initiated by RN: No    Pressure Injury (Stage 3,4, Unstageable, DTI, NWPT, and Complex wounds) if present, place Wound referral order by RN under : No    New Ostomies, if present place, Ostomy referral order under : No     Nurse 1 eSignature: Electronically signed by Dayanara Wright RN on 2/23/24 at 11:35 PM EST    **SHARE this note so that the co-signing nurse can place an eSignature**    Nurse 2 eSignature: Electronically signed by Diamond Joyce RN on 2/24/24 at 1:42 AM EST

## 2024-02-24 NOTE — PROGRESS NOTES
assistance  Sit to Supine: Partial/moderate assistance   Sit to Stand: Partial/moderate assistance  Chair/Bed to Chair Transfer: Partial/moderate assistance  Car Transfer: Partial/moderate assistance  Ambulation 10 ft: Supervision or touching assistance  Ambulation 50 ft: Supervision or touching assistance  Ambulation 150 ft:    Stairs - 1 Step: Partial/moderate assistance  Stairs - 4 Step: Partial/moderate assistance  Stairs - 12 Step:      OT    Eating: Independent  Oral Hygiene: Supervision or touching assistance  Bathing: Partial/moderate assistance  Upper Body Dressing: Partial/moderate assistance  Lower Body Dressing: Partial/moderate assistance  Toilet Transfer: Supervision or touching assistance  Toilet Hygiene: Supervision or touching assistance    Speech Therapy         ADULT DIET; Regular; No Added Salt (3-4 gm)    Body mass index is 26.55 kg/m².    Assessment and Plan:    Impairments: decreased spine ROM, balance      Acute traumatic T8 and L2 compression fractures  -Managed conservatively per Nsgy (Dr. Grace)  -TLSO when OOB  -PT/OT  -Admit labs reviewed 2/24, stable, noted mild anemia     Systolic murmur  -Per patient has h/o Rheumatic fever     H/o irregular HR per EMR  -PVCs noted on telemetry  -monitor     RAHEEM  -Noted     OA  -pain control  -PT/OT     Bladder: h/o BPH, High risk retention   -Monitor PVRs, SC prn >400cc  -continue finasteride and doxazosin     Bowel: constipation  -High risk constipation   -senna+colace BID, PRN miralax, MoM, and bisacodyl supp.     Safety   -fall precautions     Pain control  -continue acetaminophen prn, add lidocaine patch and prn tramadol     PPx  -DVT: lovenox  -GI: pantoprazole     DNR-CCA    Burke Desai DO   2/24/2024, 3:19 PM

## 2024-02-24 NOTE — FLOWSHEET NOTE
Ethnicity  \"Are you of , /a, or Canadian origin?\"  Check all that apply:  [x] A.  No, not of , /a, or Canadian Origin  [] B.  Yes, Senegalese, Senegalese American, Chicano/a  [] C.  Yes, [place] citizen  [] D.  Yes, Saeed  [] E.  Yes, another , , or Canadian origin  [] X.  Patient unable to respond    Race  \"What is your race?\"  Check all that apply:  [x] A.  White  [] B.  Black or   [] C.   or   [] D.     [] E.  Chinese  [] F.  Pakistani  [] G. Macedonian  [] H.  Yoruba  [] I.  Nigerien  [] J.  Other   [] K.    [] L.  Uzbek or Janeen  [] M.  Belarusian  [] N.  Other   [] X.  Patient unable to respond    High Risk Drug Classes:  Use and Indication    Is taking: Check if the pt is taking any medications by pharmacological classification, not how it is used, in the following classes  Indication noted: If column 1 is checked, check if there is an indication noted for all meds in the drug class Is taking  (check all that apply) Indication noted (check all that apply)   Antipsychotic [] []   Anticoagulant [x] []   Antibiotic [] []   Opioid [x] []   Antiplatelet [] []   Hypoglycemic (including insulin) [] []   None of the above []     Special Treatments, Procedures, and Programs    Check all of the following treatments, procedures, and programs that apply on admission. On admission (check all that apply)   Cancer Treatments   A1. Chemotherapy []           A2. IV []           A3. Oral []           A10. Other []   B1. Radiation []   Respiratory Therapies   C1. Oxygen Therapy []           C2. Continuous []           C3. Intermittent []           C4. High-concentration []   D1. Suctioning []           D2. Scheduled []           D3. As needed []   E1. Tracheostomy Care []   F1. Invasive Mechanical Ventilator (ventilator or respirator) []   G1. Non-invasive Mechanical Ventilator []           G2. BiPAP []
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
## 2024-02-25 PROCEDURE — 6370000000 HC RX 637 (ALT 250 FOR IP): Performed by: PHYSICAL MEDICINE & REHABILITATION

## 2024-02-25 PROCEDURE — 1280000000 HC REHAB R&B

## 2024-02-25 PROCEDURE — 6360000002 HC RX W HCPCS: Performed by: PHYSICAL MEDICINE & REHABILITATION

## 2024-02-25 PROCEDURE — 94760 N-INVAS EAR/PLS OXIMETRY 1: CPT

## 2024-02-25 RX ADMIN — FINASTERIDE 5 MG: 5 TABLET, FILM COATED ORAL at 09:22

## 2024-02-25 RX ADMIN — DORZOLAMIDE HYDROCHLORIDE AND TIMOLOL MALEATE 1 DROP: 20; 5 SOLUTION/ DROPS OPHTHALMIC at 09:22

## 2024-02-25 RX ADMIN — DORZOLAMIDE HYDROCHLORIDE AND TIMOLOL MALEATE 1 DROP: 20; 5 SOLUTION/ DROPS OPHTHALMIC at 20:25

## 2024-02-25 RX ADMIN — DOXAZOSIN MESYLATE 4 MG: 4 TABLET ORAL at 20:25

## 2024-02-25 RX ADMIN — SENNOSIDES AND DOCUSATE SODIUM 1 TABLET: 8.6; 5 TABLET ORAL at 20:25

## 2024-02-25 RX ADMIN — ENOXAPARIN SODIUM 40 MG: 100 INJECTION SUBCUTANEOUS at 09:22

## 2024-02-25 RX ADMIN — SENNOSIDES AND DOCUSATE SODIUM 1 TABLET: 8.6; 5 TABLET ORAL at 09:22

## 2024-02-25 ASSESSMENT — PAIN SCALES - GENERAL: PAINLEVEL_OUTOF10: 0

## 2024-02-25 NOTE — PLAN OF CARE
Problem: Discharge Planning  Goal: Discharge to home or other facility with appropriate resources  2/25/2024 1024 by Lara Gu RN  Outcome: Progressing     Problem: Safety - Adult  Goal: Free from fall injury  2/25/2024 1024 by Lara Gu RN  Outcome: Progressing  Flowsheets (Taken 2/25/2024 1020)  Free From Fall Injury: Instruct family/caregiver on patient safety     Problem: ABCDS Injury Assessment  Goal: Absence of physical injury  Outcome: Progressing  Flowsheets (Taken 2/25/2024 1020)  Absence of Physical Injury: Implement safety measures based on patient assessment     Problem: Pain  Goal: Verbalizes/displays adequate comfort level or baseline comfort level  2/25/2024 1024 by Lara Gu RN  Outcome: Progressing     Problem: Neurosensory - Adult  Goal: Achieves stable or improved neurological status  Outcome: Progressing     Problem: Neurosensory - Adult  Goal: Achieves maximal functionality and self care  Outcome: Progressing

## 2024-02-25 NOTE — PROGRESS NOTES
This is a 83 y.o.  male admitted on 2/23/2024 with Closed compression fracture of L2 lumbar vertebra, initial encounter ([facility]) [S32.020A]. A&O X 4. Active bowel sounds. Last BM 2/24/2024. Patient is continent of bowel & bladder. He is on Lovenox to for DVT prophylaxis. He is on a regular, MARGARET diet. Medications taken whole with thins. Skin: scattered bruising. Saline lock to right hand. Transfers with walker x 1. HS medication given. Tolerated well.  Call light and bedside table within reach. Patient instructed to call if he needs anything.

## 2024-02-25 NOTE — PROGRESS NOTES
Patient admitted to rehab with Closed compression fracture of L2 lumbar vertebra.  A/Ox4. Transfers with walker x1, gait belt. Mobility restrictions: TLSO brace when out of bed. On regular, MARGARET diet, tolerating well. Medications taken whole with thins. On Lovenox for DVT prophylaxis.  Skin: Scattered ecchymosis, reddened blanchable sacrum. Oxygen: RA. LDA: PIV right dorsal hand. Has been continent of bowel and continent of bladder. LBM 2/24. Chair/bed alarms in use and call light in reach. Will monitor for safety. Electronically signed by Lara Gu RN on 2/25/2024 at 10:26 AM

## 2024-02-25 NOTE — PLAN OF CARE
Problem: Discharge Planning  Goal: Discharge to home or other facility with appropriate resources  2/24/2024 2245 by Maryellen Gastelum, RN  Outcome: Progressing     Problem: Safety - Adult  Goal: Free from fall injury  2/24/2024 2245 by Maryellen Gastelum, RN  Outcome: Progressing     Problem: Pain  Goal: Verbalizes/displays adequate comfort level or baseline comfort level  2/24/2024 2245 by Maryellen Gastelum, RN  Outcome: Progressing     Problem: Skin/Tissue Integrity - Adult  Goal: Skin integrity remains intact  Outcome: Progressing  Flowsheets (Taken 2/24/2024 1056 by Lara Gu, RN)  Skin Integrity Remains Intact: Monitor for areas of redness and/or skin breakdown

## 2024-02-26 LAB
ANION GAP SERPL CALCULATED.3IONS-SCNC: 10 MMOL/L (ref 3–16)
BASOPHILS # BLD: 0 K/UL (ref 0–0.2)
BASOPHILS NFR BLD: 0.5 %
BUN SERPL-MCNC: 16 MG/DL (ref 7–20)
CALCIUM SERPL-MCNC: 8.8 MG/DL (ref 8.3–10.6)
CHLORIDE SERPL-SCNC: 103 MMOL/L (ref 99–110)
CO2 SERPL-SCNC: 26 MMOL/L (ref 21–32)
CREAT SERPL-MCNC: 0.7 MG/DL (ref 0.8–1.3)
DEPRECATED RDW RBC AUTO: 13.9 % (ref 12.4–15.4)
EOSINOPHIL # BLD: 0.5 K/UL (ref 0–0.6)
EOSINOPHIL NFR BLD: 8.5 %
GFR SERPLBLD CREATININE-BSD FMLA CKD-EPI: >60 ML/MIN/{1.73_M2}
GLUCOSE SERPL-MCNC: 100 MG/DL (ref 70–99)
HCT VFR BLD AUTO: 35 % (ref 40.5–52.5)
HGB BLD-MCNC: 12.1 G/DL (ref 13.5–17.5)
LYMPHOCYTES # BLD: 0.9 K/UL (ref 1–5.1)
LYMPHOCYTES NFR BLD: 15 %
MCH RBC QN AUTO: 31.9 PG (ref 26–34)
MCHC RBC AUTO-ENTMCNC: 34.5 G/DL (ref 31–36)
MCV RBC AUTO: 92.4 FL (ref 80–100)
MONOCYTES # BLD: 0.7 K/UL (ref 0–1.3)
MONOCYTES NFR BLD: 11.9 %
NEUTROPHILS # BLD: 3.9 K/UL (ref 1.7–7.7)
NEUTROPHILS NFR BLD: 64.1 %
PLATELET # BLD AUTO: 172 K/UL (ref 135–450)
PMV BLD AUTO: 7.9 FL (ref 5–10.5)
POTASSIUM SERPL-SCNC: 3.6 MMOL/L (ref 3.5–5.1)
RBC # BLD AUTO: 3.79 M/UL (ref 4.2–5.9)
SODIUM SERPL-SCNC: 139 MMOL/L (ref 136–145)
WBC # BLD AUTO: 6.1 K/UL (ref 4–11)

## 2024-02-26 PROCEDURE — 80048 BASIC METABOLIC PNL TOTAL CA: CPT

## 2024-02-26 PROCEDURE — 94760 N-INVAS EAR/PLS OXIMETRY 1: CPT

## 2024-02-26 PROCEDURE — 97530 THERAPEUTIC ACTIVITIES: CPT

## 2024-02-26 PROCEDURE — 97110 THERAPEUTIC EXERCISES: CPT

## 2024-02-26 PROCEDURE — 6370000000 HC RX 637 (ALT 250 FOR IP): Performed by: PHYSICAL MEDICINE & REHABILITATION

## 2024-02-26 PROCEDURE — 85025 COMPLETE CBC W/AUTO DIFF WBC: CPT

## 2024-02-26 PROCEDURE — 97116 GAIT TRAINING THERAPY: CPT

## 2024-02-26 PROCEDURE — 36415 COLL VENOUS BLD VENIPUNCTURE: CPT

## 2024-02-26 PROCEDURE — 6360000002 HC RX W HCPCS: Performed by: PHYSICAL MEDICINE & REHABILITATION

## 2024-02-26 PROCEDURE — 1280000000 HC REHAB R&B

## 2024-02-26 PROCEDURE — 97535 SELF CARE MNGMENT TRAINING: CPT

## 2024-02-26 RX ORDER — DOXAZOSIN MESYLATE 4 MG/1
4 TABLET ORAL NIGHTLY
Status: DISCONTINUED | OUTPATIENT
Start: 2024-02-26 | End: 2024-02-26

## 2024-02-26 RX ORDER — FINASTERIDE 5 MG/1
5 TABLET, FILM COATED ORAL NIGHTLY
Status: DISCONTINUED | OUTPATIENT
Start: 2024-02-27 | End: 2024-03-05 | Stop reason: HOSPADM

## 2024-02-26 RX ORDER — TERAZOSIN 5 MG/1
5 CAPSULE ORAL NIGHTLY
Status: DISCONTINUED | OUTPATIENT
Start: 2024-02-26 | End: 2024-02-26

## 2024-02-26 RX ORDER — FINASTERIDE 5 MG/1
5 TABLET, FILM COATED ORAL DAILY
Status: DISCONTINUED | OUTPATIENT
Start: 2024-02-26 | End: 2024-02-26

## 2024-02-26 RX ORDER — TERAZOSIN 5 MG/1
5 CAPSULE ORAL NIGHTLY
Status: DISCONTINUED | OUTPATIENT
Start: 2024-02-26 | End: 2024-03-05 | Stop reason: HOSPADM

## 2024-02-26 RX ADMIN — DORZOLAMIDE HYDROCHLORIDE AND TIMOLOL MALEATE 1 DROP: 20; 5 SOLUTION/ DROPS OPHTHALMIC at 21:55

## 2024-02-26 RX ADMIN — FINASTERIDE 5 MG: 5 TABLET, FILM COATED ORAL at 07:46

## 2024-02-26 RX ADMIN — DORZOLAMIDE HYDROCHLORIDE AND TIMOLOL MALEATE 1 DROP: 20; 5 SOLUTION/ DROPS OPHTHALMIC at 07:38

## 2024-02-26 RX ADMIN — TRAMADOL HYDROCHLORIDE 50 MG: 50 TABLET ORAL at 13:42

## 2024-02-26 RX ADMIN — SENNOSIDES AND DOCUSATE SODIUM 1 TABLET: 8.6; 5 TABLET ORAL at 07:46

## 2024-02-26 RX ADMIN — TERAZOSIN 5 MG: 5 CAPSULE ORAL at 21:54

## 2024-02-26 RX ADMIN — TRAMADOL HYDROCHLORIDE 50 MG: 50 TABLET ORAL at 07:46

## 2024-02-26 RX ADMIN — ENOXAPARIN SODIUM 40 MG: 100 INJECTION SUBCUTANEOUS at 07:45

## 2024-02-26 RX ADMIN — DICLOFENAC SODIUM 2 G: 10 GEL TOPICAL at 21:54

## 2024-02-26 ASSESSMENT — PAIN DESCRIPTION - DESCRIPTORS
DESCRIPTORS: ACHING
DESCRIPTORS: ACHING

## 2024-02-26 ASSESSMENT — PAIN DESCRIPTION - PAIN TYPE
TYPE: ACUTE PAIN
TYPE: ACUTE PAIN

## 2024-02-26 ASSESSMENT — PAIN DESCRIPTION - LOCATION
LOCATION: BACK
LOCATION: BACK

## 2024-02-26 ASSESSMENT — PAIN SCALES - GENERAL
PAINLEVEL_OUTOF10: 7
PAINLEVEL_OUTOF10: 0
PAINLEVEL_OUTOF10: 0
PAINLEVEL_OUTOF10: 7
PAINLEVEL_OUTOF10: 0

## 2024-02-26 ASSESSMENT — PAIN DESCRIPTION - ORIENTATION: ORIENTATION: LOWER
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
## 2024-02-26 NOTE — PROGRESS NOTES
[facility] Inpatient Rehabilitation Progress Note    Leeroy Aguirre  2/25/2024  [id number]    Chief Complaint: Closed compression fracture of L2 lumbar vertebra, initial encounter ([facility])    Subjective:   No acute events overnight. Patient states concern about his home medications, stated he was getting different ones at home. Discussed same medications were being given presently. States continued poor taste of his food, discussed importance of no added salt diet.    ROS: Patient Denies Fevers/Chills, Nausea/vomiting, or Chest pain.     Objective:  Patient Vitals for the past 24 hrs:   BP Temp Temp src Pulse Resp SpO2 Weight   02/25/24 2024 112/70 97.8 °F (36.6 °C) Oral 78 16 94 % --   02/25/24 0917 115/69 97.2 °F (36.2 °C) Oral 80 18 95 % --   02/25/24 0530 -- -- -- -- -- -- 76.5 kg (168 lb 10.4 oz)       Gen: No distress, pleasant.   HEENT: Normocephalic, atraumatic.   CV: Regular rate and rhythm.   Resp: No respiratory distress.   Abd: Soft, nontender, Spinal brace in place.  Ext: No edema.   Neuro: Alert, oriented, appropriately interactive.     Wt Readings from Last 3 Encounters:   02/25/24 76.5 kg (168 lb 10.4 oz)   02/23/24 79 kg (174 lb 2.6 oz)   08/30/21 77.1 kg (170 lb)       Laboratory data:   Lab Results   Component Value Date    WBC 7.0 02/24/2024    HGB 12.9 (L) 02/24/2024    HCT 36.9 (L) 02/24/2024    MCV 92.7 02/24/2024     02/24/2024       Lab Results   Component Value Date/Time     02/24/2024 07:35 AM    K 3.8 02/24/2024 07:35 AM     02/24/2024 07:35 AM    CO2 30 02/24/2024 07:35 AM    BUN 17 02/24/2024 07:35 AM    CREATININE 0.7 02/24/2024 07:35 AM    GLUCOSE 99 02/24/2024 07:35 AM    CALCIUM 9.2 02/24/2024 07:35 AM        Therapy progress:       PT    Supine to Sit: Partial/moderate assistance  Sit to Supine: Partial/moderate assistance   Sit to Stand: Partial/moderate assistance  Chair/Bed to Chair Transfer: Partial/moderate assistance  Car Transfer: Partial/moderate  assistance  Ambulation 10 ft: Supervision or touching assistance  Ambulation 50 ft: Supervision or touching assistance  Ambulation 150 ft:    Stairs - 1 Step: Partial/moderate assistance  Stairs - 4 Step: Partial/moderate assistance  Stairs - 12 Step:      OT    Eating: Independent  Oral Hygiene: Supervision or touching assistance  Bathing: Partial/moderate assistance  Upper Body Dressing: Partial/moderate assistance  Lower Body Dressing: Partial/moderate assistance  Toilet Transfer: Supervision or touching assistance  Toilet Hygiene: Supervision or touching assistance    Speech Therapy         ADULT DIET; Regular; No Added Salt (3-4 gm)  ADULT ORAL NUTRITION SUPPLEMENT; Breakfast, Lunch, Dinner; Standard High Calorie/High Protein Oral Supplement    Body mass index is 26.41 kg/m².    Assessment and Plan:    Impairments: decreased spine ROM, balance      Acute traumatic T8 and L2 compression fractures  -Managed conservatively per [facility] (Dr. Grace)  -TLSO when OOB  -PT/OT  -Admit labs reviewed 2/24, stable, noted mild anemia     Systolic murmur  -Per patient has h/o Rheumatic fever     H/o irregular HR per EMR  -PVCs noted on telemetry  -monitor     RAHEEM  -Noted     OA  -pain control  -PT/OT     Bladder: h/o BPH, High risk retention   -Monitor PVRs, SC prn >400cc  -continue finasteride and doxazosin     Bowel: constipation  -High risk constipation   -senna+colace BID, PRN miralax, MoM, and bisacodyl supp.     Safety   -fall precautions     Pain control  -continue acetaminophen prn, add lidocaine patch and prn tramadol     PPx  -DVT: lovenox  -GI: pantoprazole     DNR-CCA    Burke Desai DO   2/25/2024, 9:58 PM

## 2024-02-26 NOTE — PROGRESS NOTES
therapy at least 5 out of 7 days a week  Days Per Week: 5 Days  Hours Per Day: 1.5 hours  Therapy Duration: 10 Days  Current Treatment Recommendations: Functional mobility training;Transfer training;Gait training;Stair training;Safety education & training;Patient/Caregiver education & training;Strengthening;Balance training;Endurance training;Home exercise program;Equipment evaluation, education, & procurement;Therapeutic activities  Safety Devices  Type of Devices: Gait belt;All fall risk precautions in place;Left in chair (returned to room w/ transport)    EDUCATION  Education  Education Given To: Patient  Education Provided: Role of Therapy;Plan of Care;Precautions;Safety;Mobility Training;Transfer Training;Fall Prevention Strategies  Education Provided Comments: BLT precautions  Education Method: Demonstration;Verbal  Barriers to Learning: Cognition;Hearing  Education Outcome: Verbalized understanding;Continued education needed    PHYSICAL THERAPY  Progress Note   Second Session    Subjective: Pt presents to gym via w/c, agreeable to PT. Denies pain.    Objective: Amb ~200 ft x 2 from the therapy gym to other gym w/ car using RW, SBA - cues for slowing down and steadiness with turns.    Practiced car transfer and curb step (see above).    Amb back to the ADL bedroom to practice bed mobility (see above).  Amb additional short distance w/ RW, SBA from room to the wheelchair.     Seated ex: B 2 x 10: LAQ, marches, Hip ADD ball squeeze.    Assessment: Patient progressing ambulation w/ RW - no signs of unsteadiness but needs reminders to maintain precautions. Pt would benefit on proximal hip strengthening to improve narrow AYO during gait.      Safety Device - Type of devices:  []  All fall risk precautions in place [] Bed alarm in place  [] Call light within reach [] Chair alarm in place [] Positioning belt [x] Gait belt [x] Patient at risk for falls [] Left in bed [x] Left in chair [] Telesitter in use [] Sitter  present [] Nurse notified [x]  Care transferred to  PAVAN [] Returned to room w/ transport      Therapy Time   Individual Co-treatment   Time In 1445     Time Out 1530     Minutes 45         Electronically signed by Dylan Humphrey PT on 2/26/2024 at 2:43 PM      Therapy Time   Individual Concurrent Group Co-treatment   Time In 0900         Time Out 0945         Minutes 45           Timed Code Treatment Minutes: 45 Minutes       Dylan Humphrey PT, 02/26/24 at 2:43 PM

## 2024-02-26 NOTE — PROGRESS NOTES
This is a 83 y.o.  male admitted on 2/23/2024 with Closed compression fracture of L2 lumbar vertebra, initial encounter ([facility]) [S32.020A]. A&O X 4. Active bowel sounds. Last BM 2/24/2024. Patient is continent of bowel & bladder. He is on Lovenox to for DVT prophylaxis. He is on a regular, MARGARET diet. Medications taken whole with thins. Skin: scattered bruising. Saline lock to right hand. Transfers with walker x 1. HS medication given. Tolerated well.  Call light and bedside table within reach. Patient instructed to call if he needs anything.

## 2024-02-26 NOTE — PROGRESS NOTES
safety (Needs cues for BLT precautions)  Problem Solving: Assistance required to identify errors made  Insights: Decreased awareness of deficits  Initiation: Requires cues for some  Sequencing: Requires cues for some  Cognition Comment: able to recall 2/3 BLT precautions (2/3 with cues) -decreased new learning, talkative, can be impulsive  Orientation  Overall Orientation Status: Within Functional Limits  Orientation Level: Oriented to place;Oriented to situation;Oriented to person;Oriented to time ([place] said it was March then could read date on whiteboard)         ADL  Feeding  Assistance Level: Independent  Skilled Clinical Factors: decreased appetite, says food tastes \"terrible\"  Grooming/Oral Hygiene  Assistance Level: Stand by assist  Skilled Clinical Factors: stood at sink for hair & oral care tasks  Upper Extremity Bathing  Assistance Level: Set-up;Verbal cues;Stand by assist  Skilled Clinical Factors: seated on shower chair, cues to maintain BLT prec; OT covered R hand IV site  Lower Extremity Bathing  Assistance Level: Contact guard assist  Skilled Clinical Factors: seated on shower chair, cues to maintain BLT prec & use LH sponge to wash & dry shins<>feet, stood w/ CGA to wash elie areas, tactile cues to keep 1 hand on GB AATs to reduce fall risk  Upper Extremity Dressing  Assistance Level: Contact guard assist;Set-up;Verbal cues  Skilled Clinical Factors: seated to doff/don TLSO and shirt w/ cues, CGA to adjust them while in standing, mild posterior sway  Lower Extremity Dressing  Equipment Provided: Reachers  Assistance Level: Minimal assistance  Skilled Clinical Factors: min A to guide pt on using reacher to remove & place feet @ underwear & pants; stood w/ close SB/CGA to manage clothing over hips & buttocks  Putting On/Taking Off Footwear  Equipment Provided: Sock aid;Long-handle shoe horn  Assistance Level: Minimal assistance  Skilled Clinical Factors: used reacher to doff socks; used SA to don  Term Goal 7: increase activity tolerance to stand for 8 min for ADL/IADL tasks with TLSO in place  Short Term Goal 8: cont to assess cognition for safety with ADL/IADL tasks  Long Term Goals  Time Frame for Long Term Goals : same as stg                   Therapy Time   Individual Concurrent Group PM-treatment   Time In 1020      1430   Time Out 1130      1500   Minutes 70      30   Timed Code Treatment Minutes: 70 Minutes       Zee Scott, OTR/L #[id number]

## 2024-02-26 NOTE — PROGRESS NOTES
Department of Physical Medicine & Rehabilitation  Progress Note    Patient Identification:  Leeroy Aguirre  [id number]  : 1940  Admit date: 2024    Chief Complaint: Closed compression fracture of L2 lumbar vertebra, initial encounter ([facility])    Subjective:   No acute events overnight.   Patient seen this afternoon sitting up in room.  He reports ongoing low appetite and mild nausea.  We discussed potential contributing factors (pain, medication changes, hospital environment).   Labs reviewed.     ROS: No f/c, emesis, cp     Objective:  Patient Vitals for the past 24 hrs:   BP Temp Temp src Pulse Resp SpO2 Weight   24 1342 -- -- -- -- 17 -- --   24 0844 -- -- -- -- -- 94 % --   24 0746 -- -- -- -- 17 -- --   24 0736 -- 97.7 °F (36.5 °C) Oral -- 17 95 % --   24 0500 -- -- -- -- -- -- 76.3 kg (168 lb 3.4 oz)   24 112/70 97.8 °F (36.6 °C) Oral 78 16 94 % --     Const: Alert. No distress, pleasant.   HEENT: Normocephalic, atraumatic. Normal sclera/conjunctiva. MMM.   CV: Regular rate and rhythm.   Resp: No respiratory distress. Lungs CTAB.   Abd: Soft, nontender, nondistended, NABS+   Ext: No edema.   MSK: TLSO in place  Neuro: Alert, oriented, appropriately interactive.   Psych: Cooperative, appropriate mood and affect    Laboratory data: Available via EMR.   Last 24 hour lab  Recent Results (from the past 24 hour(s))   CBC with Auto Differential    Collection Time: 24  6:53 AM   Result Value Ref Range    WBC 6.1 4.0 - 11.0 K/uL    RBC 3.79 (L) 4.20 - 5.90 M/uL    Hemoglobin 12.1 (L) 13.5 - 17.5 g/dL    Hematocrit 35.0 (L) 40.5 - 52.5 %    MCV 92.4 80.0 - 100.0 fL    MCH 31.9 26.0 - 34.0 pg    MCHC 34.5 31.0 - 36.0 g/dL    RDW 13.9 12.4 - 15.4 %    Platelets 172 135 - 450 K/uL    MPV 7.9 5.0 - 10.5 fL    Neutrophils % 64.1 %    Lymphocytes % 15.0 %    Monocytes % 11.9 %    Eosinophils % 8.5 %    Basophils % 0.5 %    Neutrophils Absolute 3.9 1.7 - 7.7 K/uL

## 2024-02-26 NOTE — PLAN OF CARE
Problem: Discharge Planning  Goal: Discharge to home or other facility with appropriate resources  2/25/2024 2346 by Maryellen Gastelum RN  Outcome: Progressing     Problem: Safety - Adult  Goal: Free from fall injury  2/25/2024 2346 by Maryellen Gastelum RN  Outcome: Progressing     Problem: ABCDS Injury Assessment  Goal: Absence of physical injury  2/25/2024 2346 by Maryellen Gastelum RN  Outcome: Progressing     Problem: Pain  Goal: Verbalizes/displays adequate comfort level or baseline comfort level  2/25/2024 2346 by Maryellen Gastelum RN  Outcome: Progressing     Problem: Skin/Tissue Integrity - Adult  Goal: Skin integrity remains intact  2/25/2024 2346 by Maryellen Gastelum RN  Outcome: Progressing     Problem: Skin/Tissue Integrity  Goal: Absence of new skin breakdown  Description: 1.  Monitor for areas of redness and/or skin breakdown  2.  Assess vascular access sites hourly  3.  Every 4-6 hours minimum:  Change oxygen saturation probe site  4.  Every 4-6 hours:  If on nasal continuous positive airway pressure, respiratory therapy assess nares and determine need for appliance change or resting period.  2/25/2024 2346 by Maryellen Gastelum, RN  Outcome: Progressing
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
## 2024-02-26 NOTE — PLAN OF CARE
Problem: Gastrointestinal - Adult  Goal: Maintains adequate nutritional intake  Outcome: Not Progressing  Flowsheets (Taken 2/26/2024 0735)  Maintains adequate nutritional intake:   Monitor percentage of each meal consumed   Identify factors contributing to decreased intake, treat as appropriate     Problem: Nutrition Deficit:  Goal: Optimize nutritional status  Outcome: Not Progressing  Flowsheets (Taken 2/26/2024 1330)  Nutrient intake appropriate for improving, restoring, or maintaining nutritional needs: Assess nutritional status and recommend course of action  Note: Poor intake.     Problem: Discharge Planning  Goal: Discharge to home or other facility with appropriate resources  2/26/2024 1330 by Connie Escoto RN  Outcome: Progressing  Flowsheets  Taken 2/26/2024 1330  Discharge to home or other facility with appropriate resources:   Identify barriers to discharge with patient and caregiver   Identify discharge learning needs (meds, wound care, etc)  Taken 2/26/2024 0735  Discharge to home or other facility with appropriate resources:   Identify barriers to discharge with patient and caregiver   Arrange for needed discharge resources and transportation as appropriate   Identify discharge learning needs (meds, wound care, etc)  Note: Continue education for discharge.  2/25/2024 2346 by Maryellen Gastelum RN  Outcome: Progressing     Problem: Safety - Adult  Goal: Free from fall injury  2/26/2024 1330 by Connie Escoto RN  Outcome: Progressing  Flowsheets (Taken 2/26/2024 1020)  Free From Fall Injury: Instruct family/caregiver on patient safety  2/25/2024 2346 by Maryellen Gastelum RN  Outcome: Progressing     Problem: ABCDS Injury Assessment  Goal: Absence of physical injury  2/26/2024 1330 by Connie Escoto RN  Outcome: Progressing  Flowsheets (Taken 2/26/2024 1020)  Absence of Physical Injury: Implement safety measures based on patient assessment  2/25/2024 2346 by Nirav  of function  Outcome: Progressing  Flowsheets (Taken 2/26/2024 0735)  Return Mobility to Safest Level of Function:   Assess patient stability and activity tolerance for standing, transferring and ambulating with or without assistive devices   Assist with transfers and ambulation using safe patient handling equipment as needed   Ensure adequate protection for wounds/incisions during mobilization  Goal: Return ADL status to a safe level of function  Outcome: Progressing  Flowsheets (Taken 2/26/2024 0735)  Return ADL Status to a Safe Level of Function:   Administer medication as ordered   Assess activities of daily living deficits and provide assistive devices as needed     Problem: Gastrointestinal - Adult  Goal: Maintains or returns to baseline bowel function  Outcome: Progressing  Flowsheets  Taken 2/26/2024 1330  Maintains or returns to baseline bowel function:   Assess bowel function   Encourage oral fluids to ensure adequate hydration  Taken 2/26/2024 0735  Maintains or returns to baseline bowel function:   Assess bowel function   Encourage oral fluids to ensure adequate hydration   Administer ordered medications as needed  Note: Intake poor.     Problem: Skin/Tissue Integrity  Goal: Absence of new skin breakdown  Description: 1.  Monitor for areas of redness and/or skin breakdown  2.  Assess vascular access sites hourly  3.  Every 4-6 hours minimum:  Change oxygen saturation probe site  4.  Every 4-6 hours:  If on nasal continuous positive airway pressure, respiratory therapy assess nares and determine need for appliance change or resting period.  2/26/2024 1330 by Connie Escoto, RN  Outcome: Progressing  2/25/2024 2346 by Maryellen Gastelum, RN  Outcome: Progressing     Problem: Gastrointestinal - Adult  Goal: Maintains adequate nutritional intake  Outcome: Not Progressing  Flowsheets (Taken 2/26/2024 0735)  Maintains adequate nutritional intake:   Monitor percentage of each meal consumed   Identify

## 2024-02-26 NOTE — PROGRESS NOTES
Keeps setting off bed alarm, up side of bed with brace on but declines chair, therapy scheduled reviewed many times this am.

## 2024-02-26 NOTE — PROGRESS NOTES
Nutrition Note    Pt. Admitted over the weekend. Full nutrition assessment completed on 2/24. Met with Pt. To review diet acceptance. Pt. Is reporting altered taste perception and difficulty with smell impacting diet tolerance. Pt. Denies smoke inhalation of any kind prior to admission, no apparent medication changes that would impact appetite. He notes it is the worst when he foods are very aromatic. We discussed eating more cold foods to avoid overwhelming smells and trying to eat more bland foods to start (bananas, applesauce, rice, and toast.) then eating other foods with more nutritional value. He is interested in magic cup, noted Ensure TID. Will update supplement order and assess acceptance. Will continue to monitor per nutrition standards of care.     Electronically signed by Isabelle Orozco RD on 2/26/24 at 12:35 PM EST    Contact: [phone]

## 2024-02-27 PROCEDURE — 97116 GAIT TRAINING THERAPY: CPT

## 2024-02-27 PROCEDURE — 94760 N-INVAS EAR/PLS OXIMETRY 1: CPT

## 2024-02-27 PROCEDURE — 97535 SELF CARE MNGMENT TRAINING: CPT

## 2024-02-27 PROCEDURE — 1280000000 HC REHAB R&B

## 2024-02-27 PROCEDURE — 97110 THERAPEUTIC EXERCISES: CPT

## 2024-02-27 PROCEDURE — 6370000000 HC RX 637 (ALT 250 FOR IP): Performed by: PHYSICAL MEDICINE & REHABILITATION

## 2024-02-27 PROCEDURE — 6360000002 HC RX W HCPCS: Performed by: PHYSICAL MEDICINE & REHABILITATION

## 2024-02-27 PROCEDURE — 97530 THERAPEUTIC ACTIVITIES: CPT

## 2024-02-27 PROCEDURE — 97112 NEUROMUSCULAR REEDUCATION: CPT

## 2024-02-27 RX ADMIN — DICLOFENAC SODIUM 2 G: 10 GEL TOPICAL at 15:11

## 2024-02-27 RX ADMIN — SENNOSIDES AND DOCUSATE SODIUM 1 TABLET: 8.6; 5 TABLET ORAL at 07:47

## 2024-02-27 RX ADMIN — DICLOFENAC SODIUM 2 G: 10 GEL TOPICAL at 07:47

## 2024-02-27 RX ADMIN — ENOXAPARIN SODIUM 40 MG: 100 INJECTION SUBCUTANEOUS at 07:47

## 2024-02-27 RX ADMIN — TERAZOSIN 5 MG: 5 CAPSULE ORAL at 20:21

## 2024-02-27 RX ADMIN — DICLOFENAC SODIUM 2 G: 10 GEL TOPICAL at 20:23

## 2024-02-27 RX ADMIN — DORZOLAMIDE HYDROCHLORIDE AND TIMOLOL MALEATE 1 DROP: 20; 5 SOLUTION/ DROPS OPHTHALMIC at 20:21

## 2024-02-27 RX ADMIN — FINASTERIDE 5 MG: 5 TABLET, FILM COATED ORAL at 20:21

## 2024-02-27 RX ADMIN — TRAMADOL HYDROCHLORIDE 50 MG: 50 TABLET ORAL at 07:47

## 2024-02-27 RX ADMIN — DORZOLAMIDE HYDROCHLORIDE AND TIMOLOL MALEATE 1 DROP: 20; 5 SOLUTION/ DROPS OPHTHALMIC at 07:47

## 2024-02-27 ASSESSMENT — PAIN - FUNCTIONAL ASSESSMENT: PAIN_FUNCTIONAL_ASSESSMENT: ACTIVITIES ARE NOT PREVENTED

## 2024-02-27 ASSESSMENT — PAIN DESCRIPTION - ONSET: ONSET: ON-GOING

## 2024-02-27 ASSESSMENT — PAIN SCALES - GENERAL
PAINLEVEL_OUTOF10: 5
PAINLEVEL_OUTOF10: 7

## 2024-02-27 ASSESSMENT — PAIN DESCRIPTION - DESCRIPTORS: DESCRIPTORS: ACHING

## 2024-02-27 ASSESSMENT — PAIN DESCRIPTION - ORIENTATION: ORIENTATION: LOWER

## 2024-02-27 ASSESSMENT — PAIN DESCRIPTION - FREQUENCY: FREQUENCY: CONTINUOUS

## 2024-02-27 ASSESSMENT — PAIN DESCRIPTION - LOCATION: LOCATION: BACK

## 2024-02-27 ASSESSMENT — PAIN DESCRIPTION - PAIN TYPE: TYPE: ACUTE PAIN

## 2024-02-27 NOTE — PROGRESS NOTES
Department of Physical Medicine & Rehabilitation  Progress Note    Patient Identification:  Leeroy Aguirre  [id number]  : 1940  Admit date: 2024    Chief Complaint: Closed compression fracture of L2 lumbar vertebra, initial encounter ([facility])    Subjective:   No acute events overnight.   Patient seen this afternoon working with therapy in the gym.  He reports making good functional progress.  Pain overall controlled.  He reports appetite and nausea are improved with switching back to home terazosin.    Labs reviewed.     ROS: No f/c, emesis, cp     Objective:  Patient Vitals for the past 24 hrs:   BP Temp Temp src Pulse Resp SpO2 Height Weight   24 0927 -- -- -- -- -- -- 1.702 m (5' 7.01\") --   24 0911 -- -- -- -- -- 93 % -- --   24 0800 -- -- -- -- 17 -- -- --   24 0730 104/63 97.4 °F (36.3 °C) Oral 86 16 93 % -- --   24 0626 -- -- -- -- -- -- -- 77.2 kg (170 lb 3.1 oz)   24 2152 127/79 97.8 °F (36.6 °C) Oral 71 16 92 % -- --   24 1342 -- -- -- -- 17 -- -- --     Const: Alert. No distress, pleasant.   HEENT: Normocephalic, atraumatic. Normal sclera/conjunctiva. MMM.   CV: Regular rate and rhythm.   Resp: No respiratory distress. Lungs CTAB.   Abd: Soft, nontender, nondistended, NABS+   Ext: No edema.   MSK: TLSO in place  Neuro: Alert, oriented, appropriately interactive.   Psych: Cooperative, appropriate mood and affect    Laboratory data: Available via EMR.   Last 24 hour lab  No results found for this or any previous visit (from the past 24 hour(s)).      Therapy progress:  Physical therapy:  Bed Mobility:  Additional Factors: Head of bed flat, Increased time to complete, Without handrails, Verbal cues  Sit>supine:  Assistance Level: Contact guard assist, Minimal assistance  Skilled Clinical Factors: practiced log rolling with R HR on bed - needs Min A initially to lift BLEs and maintain neutral spine, demonstrates carryover with practice 2nd

## 2024-02-27 NOTE — PROGRESS NOTES
Comprehensive Nutrition Assessment    Type and Reason for Visit:  Reassess    Nutrition Recommendations/Plan:   Continue current diet   Ensure with breakfast, magic cup with lunch and dinner.      Malnutrition Assessment:  Malnutrition Status:  At risk for malnutrition (Comment) (Poor diet acceptance) (02/27/24 3104)    Context:  Acute Illness       Nutrition Assessment:    FU - met with Pt. yesterday in his room to discuss diet acceptance. See progress note by this writer competed 1/26 for further details. Supplement acceptance is good. Updated order for Ensure at breakfast and magic cup with lunch and dinner. WIll monitor supplement acceptance, and assess nutritional adequacy per nutrition standards of care.    Nutrition Related Findings:    2/26 Nutrition related labs reviewed. LBM 2/27. Wound Type:  (redness on sacrum)       Current Nutrition Intake & Therapies:    Average Meal Intake: 0%, 1-25%, %  Average Supplements Intake: 51-75%  ADULT DIET; Regular; No Added Salt (3-4 gm)  ADULT ORAL NUTRITION SUPPLEMENT; Breakfast; Standard High Calorie/High Protein Oral Supplement  ADULT ORAL NUTRITION SUPPLEMENT; Lunch, Dinner; Frozen Oral Supplement    Anthropometric Measures:  Height: 170.2 cm (5' 7.01\")  Ideal Body Weight (IBW): 148 lbs (67 kg)       Current Body Weight: 77.2 kg (170 lb 3.1 oz), 115 % IBW. Weight Source: Bed Scale  Current BMI (kg/m2): 26.7                          BMI Categories: Overweight (BMI 25.0-29.9)    Estimated Daily Nutrient Needs:  Energy Requirements Based On: Kcal/kg  Weight Used for Energy Requirements: Usual  Energy (kcal/day): 6014-8130 (25-28 kcal/76.9 kg)  Weight Used for Protein Requirements: Usual  Protein (g/day): 77-92 (1-1.2 g/76.9 kg)  Method Used for Fluid Requirements: 1 ml/kcal  Fluid (ml/day): 264-940mL    Nutrition Diagnosis:   Inadequate oral intake related to altered taste perception as evidenced by intake 0-25%, nausea, other (comment) (Heightened sense of smell  impacting diet acceptance per Pt..)    Nutrition Interventions:   Food and/or Nutrient Delivery: Continue Current Diet  Nutrition Education/Counseling:  (Monitor need)  Coordination of Nutrition Care: Continue to monitor while inpatient       Goals:  Previous Goal Met: No Progress toward Goal(s)  Goals: PO intake 75% or greater       Nutrition Monitoring and Evaluation:   Behavioral-Environmental Outcomes: None Identified  Food/Nutrient Intake Outcomes: Food and Nutrient Intake, Supplement Intake  Physical Signs/Symptoms Outcomes: Biochemical Data, GI Status, Meal Time Behavior    Discharge Planning:    Too soon to determine     Isabelle Orozco RD  Contact: [phone]
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
## 2024-02-27 NOTE — PLAN OF CARE
Problem: Gastrointestinal - Adult  Goal: Maintains adequate nutritional intake  2/27/2024 0046 by Connie Desai RN  Outcome: Progressing  Flowsheets (Taken 2/26/2024 2150)  Maintains adequate nutritional intake: Monitor percentage of each meal consumed  2/26/2024 1330 by Connie Escoto RN  Outcome: Not Progressing  Flowsheets (Taken 2/26/2024 0735)  Maintains adequate nutritional intake:   Monitor percentage of each meal consumed   Identify factors contributing to decreased intake, treat as appropriate     Problem: Nutrition Deficit:  Goal: Optimize nutritional status  2/27/2024 0046 by Connie Desai RN  Outcome: Progressing  2/26/2024 1330 by Connie Escoto RN  Outcome: Not Progressing  Flowsheets (Taken 2/26/2024 1330)  Nutrient intake appropriate for improving, restoring, or maintaining nutritional needs: Assess nutritional status and recommend course of action  Note: Poor intake.
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
## 2024-02-27 NOTE — PROGRESS NOTES
Physical Therapy  Facility/Department: [address] [facility]  Rehabilitation Physical Therapy Treatment Note    NAME: Leeroy Aguirre  : 1940 (83 y.o.)  MRN: [id number]  CODE STATUS: DNR-CCA    Date of Service: 24       Restrictions:  Restrictions/Precautions: Fall Risk  Position Activity Restriction  Spinal Precautions: No Bending, No Lifting, No Twisting  Other position/activity restrictions: TLSO brace when OOB; T8 and L2 compression fx     SUBJECTIVE  Subjective  Subjective: Patient reports that he slept better last night. Presents to gym in w/c with pressure relieving cushion  Pain: L hip pain, does not rate        OBJECTIVE  Cognition  Overall Cognitive Status: Exceptions  Following Commands: Follows one step commands with increased time  Attention Span: Attends with cues to redirect  Memory: Decreased short term memory;Decreased recall of precautions  Safety Judgement: Decreased awareness of need for safety (Needs cues for BLT precautions)  Problem Solving: Assistance required to identify errors made  Insights: Decreased awareness of deficits  Initiation: Requires cues for some  Sequencing: Requires cues for some  Cognition Comment: Able to recall 3/3 BLT precautions with increased time, cues  Orientation  Overall Orientation Status: Within Functional Limits  Orientation Level: Oriented to place;Oriented to situation;Oriented to person;Oriented to time ([place] said it was March then could read date on whiteboard)    Functional Mobility  Balance  Sitting Balance: Supervision  Standing Balance: Stand by assistance (RW)  Transfers  Surface: To chair with arms;From chair with arms;Wheelchair  Additional Factors: Set-up;Verbal cues;Increased time to complete  Device: Walker  Sit to Stand  Assistance Level: Stand by assist  Stand to Sit  Assistance Level: Stand by assist      Environmental Mobility  Ambulation  Surface: Level surface;Carpet  Device: Rolling walker  Distance: 220' x 2 with several turns,  maintaining BLT precautions throughout. Pt able to replicate transfer sit <> supine, SBA, with use of R bed rail to assist eccentric control / push up with supine <> sit. Pt needs partial assist with doff/donning TLSO brace and straps while sitting EOB.    Amb community distance up to 200' x 2 w/ RW - one standing rest break at the end of hallway. When returned to gym, transfers to NuStep seat, SBA.    Exercise Equipment: Pt was able to tolerate BLE strengthening, conditioning and reciprocal movement on the NuStep with seat at 9, LEs only and workload x ~9-10 minutes.     Stand stepping transfer w/ RW back to the chair, SBA.     Seated ex: HS curls x 15 w/ GTB, Hip ABD x 20 w/ GTB.    Safety Device - Type of devices:  []  All fall risk precautions in place [] Bed alarm in place  [] Call light within reach [] Chair alarm in place [] Positioning belt [] Gait belt [x] Patient at risk for falls [] Left in bed [] Left in chair [] Telesitter in use [] Sitter present [] Nurse notified []  Care transferred to [x] Returned to room w/ transport      Therapy Time   Individual Co-treatment   Time In 1400     Time Out 1445     Minutes 45         Electronically signed by Dylan Humphrey PT on 2/27/2024 at 3:02 PM      Therapy Time   Individual Concurrent Group Co-treatment   Time In 0815         Time Out 0900         Minutes 45           Timed Code Treatment Minutes: 45 Minutes       Dylan Humphrey PT, 02/27/24 at 3:02 PM

## 2024-02-27 NOTE — PROGRESS NOTES
Patient admitted to rehab with Closed compression fracture of L2 lumbar vertebra.  A/Ox4. Transfers with walker x1, gait belt. Mobility restrictions: TLSO brace when out of bed. On regular MARGARET diet, tolerating well. Medications taken whole with thins. On Lovenox for DVT prophylaxis.  Skin: Blanchable redness to buttocks. Oxygen: RA. LDA: none. Has been continent of bowel and continent of bladder. LBM 2/27. Chair/bed alarms in use and call light in reach. Will monitor for safety. Electronically signed by Lara Gu RN on 2/27/2024 at 9:14 AM

## 2024-02-27 NOTE — CARE COORDINATION
Team Conference held today.  Team reviewed barriers:  pain, TLSO, hearing impairment, decreased insight, recent R Fibula in Mar/23.  Team recommends continued stay on REhab to complete his therapy with anticipated DC date of Tuesday, 3-5-2024.  DME recommendations;   TTB, TSF, 3 piece hip kit.  Home care orders for sn/pt/ot.    Met with patient to review.  He does not want the TTB, TSF nor 3 piece hip kit.  He reports he already has bars in his shower and near the toilet.  He states he has the reacher and long handled shoe horn but not the sock aide.     He had me write the items down for him and he will think about it.   He does not want to continue with any home care but states he will decide closer to DC.  Provided him with CMS star rated list of agencies for his review.  He reports he will think about it.    JACIEL Stevens     Case Management   [phone]    2/27/2024  3:05 PM

## 2024-02-27 NOTE — PLAN OF CARE
Problem: Discharge Planning  Goal: Discharge to home or other facility with appropriate resources  2/27/2024 0912 by Lara Gu RN  Outcome: Progressing     Problem: Safety - Adult  Goal: Free from fall injury  2/27/2024 0912 by Lara Gu RN  Outcome: Progressing  Flowsheets (Taken 2/27/2024 0846)  Free From Fall Injury: Instruct family/caregiver on patient safety     Problem: ABCDS Injury Assessment  Goal: Absence of physical injury  2/27/2024 0912 by Lara Gu RN  Outcome: Progressing  Flowsheets (Taken 2/27/2024 0846)  Absence of Physical Injury: Implement safety measures based on patient assessment     Problem: Pain  Goal: Verbalizes/displays adequate comfort level or baseline comfort level  2/27/2024 0912 by Lara Gu RN  Outcome: Progressing     Problem: Neurosensory - Adult  Goal: Achieves stable or improved neurological status  2/27/2024 0912 by Lara Gu RN  Outcome: Progressing     Problem: Neurosensory - Adult  Goal: Achieves maximal functionality and self care  2/27/2024 0912 by Lara Gu RN  Outcome: Progressing

## 2024-02-28 PROCEDURE — 1280000000 HC REHAB R&B

## 2024-02-28 PROCEDURE — 97110 THERAPEUTIC EXERCISES: CPT

## 2024-02-28 PROCEDURE — 6360000002 HC RX W HCPCS: Performed by: PHYSICAL MEDICINE & REHABILITATION

## 2024-02-28 PROCEDURE — 97535 SELF CARE MNGMENT TRAINING: CPT

## 2024-02-28 PROCEDURE — 97530 THERAPEUTIC ACTIVITIES: CPT

## 2024-02-28 PROCEDURE — 97116 GAIT TRAINING THERAPY: CPT

## 2024-02-28 PROCEDURE — 6370000000 HC RX 637 (ALT 250 FOR IP): Performed by: PHYSICAL MEDICINE & REHABILITATION

## 2024-02-28 RX ADMIN — FINASTERIDE 5 MG: 5 TABLET, FILM COATED ORAL at 21:20

## 2024-02-28 RX ADMIN — SENNOSIDES AND DOCUSATE SODIUM 1 TABLET: 8.6; 5 TABLET ORAL at 09:14

## 2024-02-28 RX ADMIN — DORZOLAMIDE HYDROCHLORIDE AND TIMOLOL MALEATE 1 DROP: 20; 5 SOLUTION/ DROPS OPHTHALMIC at 09:14

## 2024-02-28 RX ADMIN — DICLOFENAC SODIUM 2 G: 10 GEL TOPICAL at 09:14

## 2024-02-28 RX ADMIN — TERAZOSIN 5 MG: 5 CAPSULE ORAL at 21:20

## 2024-02-28 RX ADMIN — DORZOLAMIDE HYDROCHLORIDE AND TIMOLOL MALEATE 1 DROP: 20; 5 SOLUTION/ DROPS OPHTHALMIC at 21:18

## 2024-02-28 RX ADMIN — DICLOFENAC SODIUM 2 G: 10 GEL TOPICAL at 16:14

## 2024-02-28 RX ADMIN — DICLOFENAC SODIUM 2 G: 10 GEL TOPICAL at 21:19

## 2024-02-28 RX ADMIN — ACETAMINOPHEN 650 MG: 325 TABLET ORAL at 21:20

## 2024-02-28 RX ADMIN — ENOXAPARIN SODIUM 40 MG: 100 INJECTION SUBCUTANEOUS at 09:14

## 2024-02-28 ASSESSMENT — PAIN DESCRIPTION - PAIN TYPE: TYPE: ACUTE PAIN

## 2024-02-28 ASSESSMENT — PAIN DESCRIPTION - DESCRIPTORS: DESCRIPTORS: ACHING

## 2024-02-28 ASSESSMENT — PAIN DESCRIPTION - LOCATION: LOCATION: BACK

## 2024-02-28 ASSESSMENT — PAIN DESCRIPTION - ORIENTATION: ORIENTATION: LOWER

## 2024-02-28 ASSESSMENT — PAIN SCALES - GENERAL
PAINLEVEL_OUTOF10: 2
PAINLEVEL_OUTOF10: 3
PAINLEVEL_OUTOF10: 0

## 2024-02-28 ASSESSMENT — PAIN DESCRIPTION - FREQUENCY: FREQUENCY: CONTINUOUS

## 2024-02-28 ASSESSMENT — PAIN DESCRIPTION - ONSET: ONSET: ON-GOING

## 2024-02-28 NOTE — CARE COORDINATION
Spoke with daughter to clarify DME recommendations.  Informed her that therapy recommends:   3 piece hip kit  (now changed to wide sock aid)  TTSIAEL BASURTO.  Informed her of costs associated with paying privately.  She will look at [employer].  JACIEL Stevens     Case Management   [phone]    2/28/2024  12:41 PM

## 2024-02-28 NOTE — PLAN OF CARE
Problem: Discharge Planning  Goal: Discharge to home or other facility with appropriate resources  2/27/2024 2222 by Maryellen Gastelum, RN  Outcome: Progressing     Problem: Pain  Goal: Verbalizes/displays adequate comfort level or baseline comfort level  2/27/2024 2222 by Maryellen Gastelum, RN  Outcome: Progressing     Problem: Skin/Tissue Integrity - Adult  Goal: Skin integrity remains intact  2/27/2024 2222 by Maryellen Gastelum, RN  Outcome: Progressing

## 2024-02-28 NOTE — PROGRESS NOTES
Occupational Therapy  Facility/Department: [address] RE[facility]  Rehabilitation Occupational Therapy Daily Treatment Note    Date: 24  Patient Name: Leeroy Aguirre       Room: E1K-4931/3261-01  MRN: [id number]  Account: [id number]   : 1940  (83 y.o.) Gender: male                    Past Medical History:  has a past medical history of Arthritis, BPH (benign prostatic hyperplasia), GERD (gastroesophageal reflux disease), Hyperlipidemia, Irregular heartbeat, and Sleep apnea.  Past Surgical History:   has a past surgical history that includes Tonsillectomy; Finger surgery; Foot neuroma surgery; Cataract removal; Foot surgery (2012); eye surgery (Left); Cataract removal with implant (Bilateral); Colonoscopy (2017); Upper gastrointestinal endoscopy (2021); and Upper gastrointestinal endoscopy (N/A, 2021).    Restrictions  Restrictions/Precautions: Fall Risk  Other position/activity restrictions: TLSO brace when OOB; T8 and L2 compression fx  Equipment Used: Other (recliner)    Subjective  Subjective: met in room, he is seated in recliner, already dressed; scheduled for shower this AM  Restrictions/Precautions: Fall Risk             Objective     Cognition  Overall Cognitive Status: Exceptions  Following Commands: Follows one step commands with increased time  Attention Span: Attends with cues to redirect  Memory: Decreased short term memory;Decreased recall of precautions  Safety Judgement: Decreased awareness of need for safety (Needs cues for BLT precautions)  Problem Solving: Assistance required to identify errors made  Insights: Decreased awareness of deficits  Sequencing: Requires cues for some  Cognition Comment: Able to recall 3/3 BLT precautions with increased time, cues; enjoys talking about muscle cars  Orientation  Overall Orientation Status: Within Functional Limits  Orientation Level: Oriented to place;Oriented to situation;Oriented to person;Oriented to time ([employer] said it  training;Positioning;Modalities;Pain management;Cognitive/Perceptual training    Goals  Patient Goals   Patient goals : \"I want to be able to take care of myself, be able to drive\"  Short Term Goals  Time Frame for Short Term Goals: 7-10 days pt will....  Short Term Goal 1: bathe with set up (remove/replace TLSO)  Short Term Goal 2: dress UB with MI including TLSO, LB indep with AD  Short Term Goal 3: toilet indep with AD - grab bar  Short Term Goal 4: transfer indep with LRAD  Short Term Goal 5: functional mobility and simple meal prep indep with LRAD  Short Term Goal 6: complete caregiver education, address DME needs to maintain spinal precautions throughout ADL tasks/mobility indep  Short Term Goal 7: increase activity tolerance to stand for 8 min for ADL/IADL tasks with TLSO in place  Short Term Goal 8: cont to assess cognition for safety with BLT precautions during ADLs/IADL tasks  Long Term Goals  Time Frame for Long Term Goals : same as stg                 Therapy Time   Individual Concurrent Group Co-treatment   Time In 0815         Time Out 0915         Minutes 60         Timed Code Treatment Minutes: 60 Minutes       Zee Scott, OTR/L #[id number]

## 2024-02-28 NOTE — PROGRESS NOTES
Department of Physical Medicine & Rehabilitation  Progress Note    Patient Identification:  Leeroy Aguirre  [id number]  : 1940  Admit date: 2024    Chief Complaint: Closed compression fracture of L2 lumbar vertebra, initial encounter ([facility])    Subjective:   No acute events overnight.   Patient seen this am sitting up in gym. Reports some acute on chronic right medial knee pain. He attributes this to fracture and MCL strain he had in 2023. Using diclofenac gel for pain control. PT and I d/w patient importance of strengthening musculature around the knee. He reports back pain is overall controlled.    Labs reviewed.     ROS: No f/c, emesis, cp     Objective:  Patient Vitals for the past 24 hrs:   BP Temp Temp src Pulse Resp SpO2 Weight   24 0912 124/82 97.5 °F (36.4 °C) Oral 99 18 95 % --   24 0545 -- -- -- -- -- -- 75.7 kg (166 lb 14.2 oz)   24 123/79 98 °F (36.7 °C) Oral 93 16 93 % --     Const: Alert. No distress, pleasant.   HEENT: Normocephalic, atraumatic. Normal sclera/conjunctiva. MMM.   CV: Regular rate and rhythm.   Resp: No respiratory distress. Lungs CTAB.   Abd: Soft, nontender, nondistended, NABS+   Ext: No edema.   MSK: TLSO in place  Neuro: Alert, oriented, appropriately interactive.   Psych: Cooperative, appropriate mood and affect    Laboratory data: Available via EMR.   Last 24 hour lab  No results found for this or any previous visit (from the past 24 hour(s)).      Therapy progress:  Physical therapy:  Bed Mobility:  Additional Factors: Head of bed flat, Increased time to complete, Without handrails, Verbal cues  Sit>supine:  Assistance Level: Contact guard assist, Minimal assistance  Skilled Clinical Factors: practiced log rolling with R HR on bed - needs Min A initially to lift BLEs and maintain neutral spine, demonstrates carryover with practice 2nd time  Supine>sit:  Assistance Level: Contact guard assist, Minimal assistance  Skilled Clinical Factors: uses R  traumatic T8 and L2 compression fractures  -Managed conservatively per Nsgy (Dr. Grace)  -TLSO when OOB  -PT/OT     Systolic murmur  -Per patient has h/o Rheumatic fever     H/o irregular HR per EMR  -PVCs noted on telemetry  -monitor     RAHEEM  -Noted     OA  -pain control  -PT/OT     Bladder: h/o BPH, High risk retention   -Monitor PVRs, SC prn >400cc  -continue finasteride and terazosin      Bowel: constipation  -High risk constipation   -senna+colace BID, PRN miralax, MoM, and bisacodyl supp.     Safety   -fall precautions     Pain control  -continue acetaminophen prn, add lidocaine patch and prn tramadol     PPx  -DVT: lovenox  -GI: pantoprazole    Rehab Progress: Making progress. Overall standby to contact-guard assist for mobility.  Set up to min assist for ADLs.  Barriers include: Spine precautions/TLSO brace, pain, insight, steps to enter home, wife recently s/p ICD/PPM.    Anticipated Dispo: home with spouse  Services:  PT, OT, RN  DME: ISAEL CHANG  ELOS: 3/5      Marian Mendoza MD 2/28/2024, 1:02 PM
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
## 2024-02-28 NOTE — PROGRESS NOTES
Physical Therapy  Facility/Department: [address] REHAB  Rehabilitation Physical Therapy Treatment Note    NAME: Leeroy Aguirre  : 1940 (83 y.o.)  MRN: [id number]  CODE STATUS: DNR-CCA    Date of Service: 24       Restrictions:  Restrictions/Precautions: Fall Risk  Position Activity Restriction  Spinal Precautions: No Bending, No Lifting, No Twisting  Other position/activity restrictions: TLSO brace when OOB; T8 and L2 compression fx     SUBJECTIVE  Subjective  Subjective: Pt reports more stiffness in R knee with increased activity and ambulation. Slightly tender to palpation surrounding R knee joint/ MCL but no significant pain or ROM deficit R vs. L.  Pain: c/o R knee and L hip pain - does not rate     OBJECTIVE  Cognition  Overall Cognitive Status: Exceptions  Following Commands: Follows one step commands with increased time  Attention Span: Attends with cues to redirect  Memory: Decreased short term memory;Decreased recall of precautions  Safety Judgement: Decreased awareness of need for safety (Needs cues for BLT precautions)  Problem Solving: Assistance required to identify errors made  Insights: Decreased awareness of deficits  Sequencing: Requires cues for some  Cognition Comment: Able to recall 3/3 BLT precautions  Orientation  Overall Orientation Status: Within Functional Limits  Orientation Level: Oriented to place;Oriented to situation;Oriented to person;Oriented to time ([place] said it was March then could read date on whiteboard)    Functional Mobility  Balance  Sitting Balance: Supervision  Standing Balance: Stand by assistance (RW)  Transfers  Surface: To chair with arms;From chair with arms;Wheelchair  Additional Factors: Set-up;Verbal cues;Increased time to complete  Device: Walker  Sit to Stand  Assistance Level: Stand by assist  Stand to Sit  Assistance Level: Stand by assist      Environmental Mobility  Ambulation  Surface: Level surface;Carpet  Device: Rolling walker  Distance:
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
## 2024-02-28 NOTE — PLAN OF CARE
Problem: Discharge Planning  Goal: Discharge to home or other facility with appropriate resources  2/28/2024 1000 by Lara Gu, RN  Outcome: Progressing     Problem: Safety - Adult  Goal: Free from fall injury  Outcome: Progressing  Flowsheets (Taken 2/28/2024 0958)  Free From Fall Injury: Instruct family/caregiver on patient safety     Problem: ABCDS Injury Assessment  Goal: Absence of physical injury  Outcome: Progressing  Flowsheets (Taken 2/28/2024 0958)  Absence of Physical Injury: Implement safety measures based on patient assessment     Problem: Pain  Goal: Verbalizes/displays adequate comfort level or baseline comfort level  2/28/2024 1000 by Lara Gu, RN  Outcome: Progressing     Problem: Neurosensory - Adult  Goal: Achieves stable or improved neurological status  Outcome: Progressing     Problem: Neurosensory - Adult  Goal: Achieves maximal functionality and self care  Outcome: Progressing

## 2024-02-29 LAB
ANION GAP SERPL CALCULATED.3IONS-SCNC: 7 MMOL/L (ref 3–16)
BASOPHILS # BLD: 0 K/UL (ref 0–0.2)
BASOPHILS NFR BLD: 0.5 %
BUN SERPL-MCNC: 19 MG/DL (ref 7–20)
CALCIUM SERPL-MCNC: 9.6 MG/DL (ref 8.3–10.6)
CHLORIDE SERPL-SCNC: 104 MMOL/L (ref 99–110)
CO2 SERPL-SCNC: 29 MMOL/L (ref 21–32)
CREAT SERPL-MCNC: 0.7 MG/DL (ref 0.8–1.3)
DEPRECATED RDW RBC AUTO: 13.2 % (ref 12.4–15.4)
EOSINOPHIL # BLD: 0.5 K/UL (ref 0–0.6)
EOSINOPHIL NFR BLD: 9.9 %
GFR SERPLBLD CREATININE-BSD FMLA CKD-EPI: >60 ML/MIN/{1.73_M2}
GLUCOSE SERPL-MCNC: 88 MG/DL (ref 70–99)
HCT VFR BLD AUTO: 32.8 % (ref 40.5–52.5)
HGB BLD-MCNC: 11.7 G/DL (ref 13.5–17.5)
LYMPHOCYTES # BLD: 0.9 K/UL (ref 1–5.1)
LYMPHOCYTES NFR BLD: 18.8 %
MAGNESIUM SERPL-MCNC: 2.6 MG/DL (ref 1.8–2.4)
MCH RBC QN AUTO: 32.5 PG (ref 26–34)
MCHC RBC AUTO-ENTMCNC: 35.7 G/DL (ref 31–36)
MCV RBC AUTO: 91 FL (ref 80–100)
MONOCYTES # BLD: 0.6 K/UL (ref 0–1.3)
MONOCYTES NFR BLD: 12.6 %
NEUTROPHILS # BLD: 2.9 K/UL (ref 1.7–7.7)
NEUTROPHILS NFR BLD: 58.2 %
PLATELET # BLD AUTO: 193 K/UL (ref 135–450)
PMV BLD AUTO: 7.7 FL (ref 5–10.5)
POTASSIUM SERPL-SCNC: 3.5 MMOL/L (ref 3.5–5.1)
RBC # BLD AUTO: 3.6 M/UL (ref 4.2–5.9)
SODIUM SERPL-SCNC: 140 MMOL/L (ref 136–145)
WBC # BLD AUTO: 5 K/UL (ref 4–11)

## 2024-02-29 PROCEDURE — 1280000000 HC REHAB R&B

## 2024-02-29 PROCEDURE — 97535 SELF CARE MNGMENT TRAINING: CPT

## 2024-02-29 PROCEDURE — 94760 N-INVAS EAR/PLS OXIMETRY 1: CPT

## 2024-02-29 PROCEDURE — 83735 ASSAY OF MAGNESIUM: CPT

## 2024-02-29 PROCEDURE — 6370000000 HC RX 637 (ALT 250 FOR IP): Performed by: PHYSICAL MEDICINE & REHABILITATION

## 2024-02-29 PROCEDURE — 97110 THERAPEUTIC EXERCISES: CPT

## 2024-02-29 PROCEDURE — 6360000002 HC RX W HCPCS: Performed by: PHYSICAL MEDICINE & REHABILITATION

## 2024-02-29 PROCEDURE — 97530 THERAPEUTIC ACTIVITIES: CPT

## 2024-02-29 PROCEDURE — 97116 GAIT TRAINING THERAPY: CPT

## 2024-02-29 PROCEDURE — 85025 COMPLETE CBC W/AUTO DIFF WBC: CPT

## 2024-02-29 PROCEDURE — 36415 COLL VENOUS BLD VENIPUNCTURE: CPT

## 2024-02-29 PROCEDURE — 80048 BASIC METABOLIC PNL TOTAL CA: CPT

## 2024-02-29 RX ADMIN — DICLOFENAC SODIUM 2 G: 10 GEL TOPICAL at 21:10

## 2024-02-29 RX ADMIN — TRAMADOL HYDROCHLORIDE 50 MG: 50 TABLET ORAL at 09:35

## 2024-02-29 RX ADMIN — DORZOLAMIDE HYDROCHLORIDE AND TIMOLOL MALEATE 1 DROP: 20; 5 SOLUTION/ DROPS OPHTHALMIC at 08:08

## 2024-02-29 RX ADMIN — DICLOFENAC SODIUM 2 G: 10 GEL TOPICAL at 09:10

## 2024-02-29 RX ADMIN — DICLOFENAC SODIUM 2 G: 10 GEL TOPICAL at 13:22

## 2024-02-29 RX ADMIN — DORZOLAMIDE HYDROCHLORIDE AND TIMOLOL MALEATE 1 DROP: 20; 5 SOLUTION/ DROPS OPHTHALMIC at 21:11

## 2024-02-29 RX ADMIN — ENOXAPARIN SODIUM 40 MG: 100 INJECTION SUBCUTANEOUS at 08:09

## 2024-02-29 RX ADMIN — FINASTERIDE 5 MG: 5 TABLET, FILM COATED ORAL at 21:08

## 2024-02-29 RX ADMIN — SENNOSIDES AND DOCUSATE SODIUM 1 TABLET: 8.6; 5 TABLET ORAL at 21:07

## 2024-02-29 RX ADMIN — TERAZOSIN 5 MG: 5 CAPSULE ORAL at 21:09

## 2024-02-29 RX ADMIN — TRAMADOL HYDROCHLORIDE 50 MG: 50 TABLET ORAL at 21:06

## 2024-02-29 ASSESSMENT — PAIN SCALES - WONG BAKER
WONGBAKER_NUMERICALRESPONSE: 0

## 2024-02-29 ASSESSMENT — PAIN SCALES - GENERAL
PAINLEVEL_OUTOF10: 0
PAINLEVEL_OUTOF10: 0
PAINLEVEL_OUTOF10: 7
PAINLEVEL_OUTOF10: 0
PAINLEVEL_OUTOF10: 0

## 2024-02-29 ASSESSMENT — PAIN DESCRIPTION - LOCATION
LOCATION: BACK
LOCATION: BACK

## 2024-02-29 ASSESSMENT — PAIN DESCRIPTION - DESCRIPTORS
DESCRIPTORS: ACHING
DESCRIPTORS: ACHING

## 2024-02-29 ASSESSMENT — PAIN DESCRIPTION - ORIENTATION: ORIENTATION: LOWER;MID

## 2024-02-29 ASSESSMENT — PAIN DESCRIPTION - PAIN TYPE: TYPE: ACUTE PAIN

## 2024-02-29 ASSESSMENT — PAIN - FUNCTIONAL ASSESSMENT: PAIN_FUNCTIONAL_ASSESSMENT: PREVENTS OR INTERFERES WITH MANY ACTIVE NOT PASSIVE ACTIVITIES

## 2024-02-29 NOTE — PROGRESS NOTES
Physical Therapy  Facility/Department: [address] REHAB  Rehabilitation Physical Therapy Treatment Note    NAME: Leeroy Aguirre  : 1940 (83 y.o.)  MRN: [id number]  CODE STATUS: DNR-CCA    Date of Service: 24       Restrictions:  Restrictions/Precautions: Fall Risk  Position Activity Restriction  Spinal Precautions: No Bending, No Lifting, No Twisting (pt recalls)  Other position/activity restrictions: TLSO brace when OOB; T8 and L2 compression fx     SUBJECTIVE  Subjective  Subjective: Pt reports more stiffness in R knee with increased activity and ambulation.  Pain: c/o R knee and L hip pain - does not rate    OBJECTIVE  Cognition  Overall Cognitive Status: Exceptions  Following Commands: Follows one step commands with increased time  Attention Span: Attends with cues to redirect  Memory: Decreased short term memory;Decreased recall of precautions  Safety Judgement: Decreased awareness of need for safety (Needs cues for BLT precautions)  Problem Solving: Assistance required to identify errors made  Insights: Decreased awareness of deficits  Sequencing: Requires cues for some  Cognition Comment: Able to recall 3/3 BLT precautions  Orientation  Overall Orientation Status: Within Functional Limits  Orientation Level: Oriented to place;Oriented to situation;Oriented to person;Oriented to time ([place] said it was March then could read date on whiteboard)    Functional Mobility  Transfers  Surface: To chair with arms;From chair with arms;Wheelchair  Additional Factors: Set-up;Verbal cues;Increased time to complete  Device: Walker  Sit to Stand  Assistance Level: Stand by assist  Stand to Sit  Assistance Level: Stand by assist  Bed To/From Chair  Assistance Level: Stand by assist  Stand Pivot  Assistance Level: Stand by assist  Car Transfer  Assistance Level: Stand by assist      Environmental Mobility  Ambulation  Surface: Level surface;Carpet  Device: Rolling walker  Distance: 220' x 2 with several turns,  hours  Therapy Duration: 10 Days  Current Treatment Recommendations: Functional mobility training;Transfer training;Gait training;Stair training;Safety education & training;Patient/Caregiver education & training;Strengthening;Balance training;Endurance training;Home exercise program;Equipment evaluation, education, & procurement;Therapeutic activities  Safety Devices  Type of Devices: Gait belt;All fall risk precautions in place    EDUCATION           Therapy Time   Individual Concurrent Group Co-treatment   Time In 1045         Time Out 1135         Minutes 50           Timed Code Treatment Minutes: 50 Minutes       Olive Shirley PT, 02/29/24 at 12:26 PM     PM session: pt with no new c/o.  O: sitting AP, ankle circles, TKE, hip flex x 15.  Sit to stand SBA to S, amb 220' with no AD, 2 surface transitions and 4 turns S to SBA.  Bed mobility S.  Up and down 12 steps B rails SBA.  6\" curb SBA no AD.  Standing balloon toss SBA to CGA with quick pace and good reflexes, pt with good decision making regarding how far he can safely reach.  Pt used reacher to pick several objects up from the floor S.  Amb again 450' on tile and low pile carpet with several turns and S.    Second Session Therapy Time     Individual Co-treatment   Time In 1230     Time Out 1312     Minutes 42        Electronically signed by Olive Shirley PT on 2/29/2024 at 1:12 PM

## 2024-02-29 NOTE — PLAN OF CARE
Problem: Safety - Adult  Goal: Free from fall injury  2/28/2024 2316 by Brandie Ferrell, RN  Outcome: Progressing  Flowsheets (Taken 2/28/2024 2316)  Free From Fall Injury:   Instruct family/caregiver on patient safety   Based on caregiver fall risk screen, instruct family/caregiver to ask for assistance with transferring infant if caregiver noted to have fall risk factors  Note: Pt educated on falls precautions and safety. Call light and personal belongings within reach at all times. Non skid socks in use when up. Hourly rounding and alarms active.      Problem: Pain  Goal: Verbalizes/displays adequate comfort level or baseline comfort level  2/28/2024 2316 by Brandie Ferrell, RN  Outcome: Progressing  Flowsheets (Taken 2/28/2024 2316)  Verbalizes/displays adequate comfort level or baseline comfort level:   Administer analgesics based on type and severity of pain and evaluate response   Consider cultural and social influences on pain and pain management   Encourage patient to monitor pain and request assistance  Note: Able to rate pain using a 1-10 scale, medicated per prn orders, see MAR. Able to verbalize a reduction in pain and/or able to fall asleep and remain asleep without any s/s of pain      Problem: Skin/Tissue Integrity  Goal: Absence of new skin breakdown  Description: 1.  Monitor for areas of redness and/or skin breakdown  2.  Assess vascular access sites hourly  3.  Every 4-6 hours minimum:  Change oxygen saturation probe site  4.  Every 4-6 hours:  If on nasal continuous positive airway pressure, respiratory therapy assess nares and determine need for appliance change or resting period.  2/28/2024 2316 by Brandie Ferrell, RN  Outcome: Progressing  Note: Able to change positions in bed without assist, no evidence of skin breakdown noted. Waffle cushion in place to chair.

## 2024-02-29 NOTE — PROGRESS NOTES
Department of Physical Medicine & Rehabilitation  Progress Note    Patient Identification:  Leeroy Aguirre  [id number]  : 1940  Admit date: 2024    Chief Complaint: Closed compression fracture of L2 lumbar vertebra, initial encounter ([facility])    Subjective:   No acute events overnight.   Patient seen this am sitting up in gym, working on upper body exercises with OT.  He asked for clarification on discharge date and I confirmed discharge scheduled for 3/5.  Also asks about lab tests.  Education provided.  RN reports poor appetite continues.  Will remove salt restriction.  Labs reviewed.     ROS: No f/c, emesis, cp     Objective:  Patient Vitals for the past 24 hrs:   BP Temp Temp src Pulse Resp SpO2 Weight   24 0935 -- -- -- -- 16 -- --   24 0846 -- -- -- -- -- 95 % --   24 0806 132/72 97.2 °F (36.2 °C) Oral 83 17 94 % --   24 0524 -- -- -- -- -- -- 78.1 kg (172 lb 2.9 oz)   24 2045 131/82 97.6 °F (36.4 °C) Oral 97 18 94 % --     Const: Alert. No distress, pleasant.   HEENT: Normocephalic, atraumatic. Normal sclera/conjunctiva. MMM.   CV: Regular rate and rhythm.   Resp: No respiratory distress. Lungs CTAB.   Abd: Soft, nontender, nondistended, NABS+   Ext: No edema.   MSK: TLSO in place  Neuro: Alert, oriented, appropriately interactive.   Psych: Cooperative, appropriate mood and affect    Laboratory data: Available via EMR.   Last 24 hour lab  Recent Results (from the past 24 hour(s))   CBC with Auto Differential    Collection Time: 24  6:19 AM   Result Value Ref Range    WBC 5.0 4.0 - 11.0 K/uL    RBC 3.60 (L) 4.20 - 5.90 M/uL    Hemoglobin 11.7 (L) 13.5 - 17.5 g/dL    Hematocrit 32.8 (L) 40.5 - 52.5 %    MCV 91.0 80.0 - 100.0 fL    MCH 32.5 26.0 - 34.0 pg    MCHC 35.7 31.0 - 36.0 g/dL    RDW 13.2 12.4 - 15.4 %    Platelets 193 135 - 450 K/uL    MPV 7.7 5.0 - 10.5 fL    Neutrophils % 58.2 %    Lymphocytes % 18.8 %    Monocytes % 12.6 %    Eosinophils % 9.9 %

## 2024-02-29 NOTE — PLAN OF CARE
Problem: Nutrition Deficit:  Goal: Optimize nutritional status  2/29/2024 1014 by Connie Escoto RN  Outcome: Not Progressing  2/28/2024 2316 by Brandie Ferrell RN  Outcome: Progressing     Problem: Discharge Planning  Goal: Discharge to home or other facility with appropriate resources  2/29/2024 1014 by Connie Escoto RN  Outcome: Progressing  Flowsheets  Taken 2/29/2024 1014  Discharge to home or other facility with appropriate resources:   Identify barriers to discharge with patient and caregiver   Arrange for needed discharge resources and transportation as appropriate   Identify discharge learning needs (meds, wound care, etc)  Taken 2/29/2024 0702  Discharge to home or other facility with appropriate resources:   Identify barriers to discharge with patient and caregiver   Arrange for needed discharge resources and transportation as appropriate   Identify discharge learning needs (meds, wound care, etc)  Note: Doing well with application of brace.  2/28/2024 2316 by Brandie Ferrell RN  Outcome: Progressing     Problem: Safety - Adult  Goal: Free from fall injury  2/29/2024 1014 by Connie Escoto RN  Outcome: Progressing  Flowsheets  Taken 2/29/2024 1014  Free From Fall Injury: Instruct family/caregiver on patient safety  Taken 2/29/2024 0914  Free From Fall Injury: Instruct family/caregiver on patient safety  Note: Some cues for walker safety.  2/28/2024 2316 by Brandie Ferrell, RN  Outcome: Progressing  Flowsheets (Taken 2/28/2024 2316)  Free From Fall Injury:   Instruct family/caregiver on patient safety   Based on caregiver fall risk screen, instruct family/caregiver to ask for assistance with transferring infant if caregiver noted to have fall risk factors  Note: Pt educated on falls precautions and safety. Call light and personal belongings within reach at all times. Non skid socks in use when up. Hourly rounding and alarms active.      Problem: ABCDS Injury
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
## 2024-02-29 NOTE — PROGRESS NOTES
Occupational Therapy  Facility/Department: [address] REHAB  Rehabilitation Occupational Therapy Daily Treatment Note    Date: 24  Patient Name: Leeroy Aguirre       Room: G3B-6601/3261-01  MRN: [id number]  Account: [id number]   : 1940  (83 y.o.) Gender: male                    Past Medical History:  has a past medical history of Arthritis, BPH (benign prostatic hyperplasia), GERD (gastroesophageal reflux disease), Hyperlipidemia, Irregular heartbeat, and Sleep apnea.  Past Surgical History:   has a past surgical history that includes Tonsillectomy; Finger surgery; Foot neuroma surgery; Cataract removal; Foot surgery (2012); eye surgery (Left); Cataract removal with implant (Bilateral); Colonoscopy (2017); Upper gastrointestinal endoscopy (2021); and Upper gastrointestinal endoscopy (N/A, 2021).    Restrictions  Restrictions/Precautions: Fall Risk  Other position/activity restrictions: TLSO brace when OOB; T8 and L2 compression fx  Equipment Used: Other (recliner)    Subjective  Subjective: met in therapy dept, he arrived via w/c; states he \"didn't sleep well last night\"--agreeable for UE strengthening, practicing transfers  Restrictions/Precautions: Fall Risk             Objective     Cognition  Overall Cognitive Status: Exceptions  Following Commands: Follows one step commands with increased time  Attention Span: Attends with cues to redirect  Memory: Decreased short term memory;Decreased recall of precautions  Safety Judgement: Decreased awareness of need for safety (Needs cues for BLT precautions)  Problem Solving: Assistance required to identify errors made  Insights: Decreased awareness of deficits  Sequencing: Requires cues for some  Cognition Comment: Able to recall 3/3 BLT precautions  Orientation  Overall Orientation Status: Within Functional Limits  Orientation Level: Oriented to place;Oriented to situation;Oriented to person;Oriented to time ([place] said it was March

## 2024-02-29 NOTE — PROGRESS NOTES
Patient admitted to rehab with closed compression fx L2, no surgery, TLSO when up.  A/Ox4, recall can vary. Transfers with walker, some cues needed. Mobility restrictions: back safety. Now on regular diet, tolerating poor to fair. Medications taken whole. On Lovenox for DVT prophylaxis.  Skin: monitor. Has been continent of bowel and of bladder. LBM today, declined senna. Chair/bed alarms in use and call light in reach. Will monitor for safety. Reviewed pain management.
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
## 2024-03-01 PROCEDURE — 97530 THERAPEUTIC ACTIVITIES: CPT

## 2024-03-01 PROCEDURE — 6370000000 HC RX 637 (ALT 250 FOR IP): Performed by: PHYSICAL MEDICINE & REHABILITATION

## 2024-03-01 PROCEDURE — 1280000000 HC REHAB R&B

## 2024-03-01 PROCEDURE — 97116 GAIT TRAINING THERAPY: CPT

## 2024-03-01 PROCEDURE — 94760 N-INVAS EAR/PLS OXIMETRY 1: CPT

## 2024-03-01 PROCEDURE — 6360000002 HC RX W HCPCS: Performed by: PHYSICAL MEDICINE & REHABILITATION

## 2024-03-01 PROCEDURE — 97110 THERAPEUTIC EXERCISES: CPT

## 2024-03-01 PROCEDURE — 97535 SELF CARE MNGMENT TRAINING: CPT

## 2024-03-01 RX ORDER — TRAMADOL HYDROCHLORIDE 50 MG/1
50 TABLET ORAL EVERY 6 HOURS PRN
Status: DISCONTINUED | OUTPATIENT
Start: 2024-03-01 | End: 2024-03-05 | Stop reason: HOSPADM

## 2024-03-01 RX ADMIN — ENOXAPARIN SODIUM 40 MG: 100 INJECTION SUBCUTANEOUS at 07:46

## 2024-03-01 RX ADMIN — DORZOLAMIDE HYDROCHLORIDE AND TIMOLOL MALEATE 1 DROP: 20; 5 SOLUTION/ DROPS OPHTHALMIC at 19:54

## 2024-03-01 RX ADMIN — FINASTERIDE 5 MG: 5 TABLET, FILM COATED ORAL at 19:56

## 2024-03-01 RX ADMIN — TERAZOSIN 5 MG: 5 CAPSULE ORAL at 19:54

## 2024-03-01 RX ADMIN — DICLOFENAC SODIUM 2 G: 10 GEL TOPICAL at 13:11

## 2024-03-01 RX ADMIN — DICLOFENAC SODIUM 2 G: 10 GEL TOPICAL at 19:55

## 2024-03-01 RX ADMIN — DORZOLAMIDE HYDROCHLORIDE AND TIMOLOL MALEATE 1 DROP: 20; 5 SOLUTION/ DROPS OPHTHALMIC at 07:48

## 2024-03-01 RX ADMIN — DICLOFENAC SODIUM 2 G: 10 GEL TOPICAL at 09:28

## 2024-03-01 ASSESSMENT — PAIN SCALES - GENERAL: PAINLEVEL_OUTOF10: 0
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
## 2024-03-01 NOTE — PROGRESS NOTES
Patient admitted to rehab with compression fracture.  A/Ox4. Transfers with no device. Mobility restrictions: back safety, TLSO when up. On regular diet, tolerating poorly, did speak to dietitian, will not take current supplements, willing to try Ensure clear.. Medications taken whole. On Lovenox for DVT prophylaxis.  Skin: monitor. Has been continent of bowel and of bladder. LBM today, declined need for scheduled laxative. Chair/bed alarms in use and call light in reach. Will monitor for safety.

## 2024-03-01 NOTE — PROGRESS NOTES
Physical Therapy  Facility/Department: [address] REHAB  Rehabilitation Physical Therapy Treatment Note    NAME: Leeroy Aguirre  : 1940 (83 y.o.)  MRN: [id number]  CODE STATUS: DNR-CCA    Date of Service: 3/1/24       Restrictions:  Restrictions/Precautions: Fall Risk  Position Activity Restriction  Spinal Precautions: No Bending, No Lifting, No Twisting (pt recalls)  Other position/activity restrictions: TLSO brace when OOB; T8 and L2 compression fx     SUBJECTIVE  Subjective  Subjective: Pt states that his R knee feels a little better with Voltaren gel.  Pain: c/o R knee and L hip pain - does not rate       OBJECTIVE  Cognition  Overall Cognitive Status: WNL  Memory: Decreased recall of precautions  Cognition Comment: Able to recall 3/3 BLT precautions, occasional cues to maintain during ADLs/IADLs  Orientation  Overall Orientation Status: Within Normal Limits  Orientation Level: Oriented X4    Functional Mobility  Bed Mobility  Overall Assistance Level: Stand By Assist  Additional Factors: Head of bed flat;Increased time to complete;Without handrails;Verbal cues  Sit to Supine  Assistance Level: Stand by assist  Skilled Clinical Factors: practiced log rolling x 4 times with R handrail on bed - cues to avoid twisting and to slow down mechanics - moderate carryover and improves with practice. Needs assist for managing TLSO brace while maintaining precautions  Supine to Sit  Assistance Level: Stand by assist  Skilled Clinical Factors: uses R HR to push up and maintain neutral spine - improved with practice  Transfers  Surface: To chair with arms;From chair with arms;Wheelchair  Additional Factors: Set-up;Increased time to complete  Device: Walker  Sit to Stand  Assistance Level: Supervision  Stand to Sit  Assistance Level: Supervision  Stand Pivot  Assistance Level: Supervision  Skilled Clinical Factors: transfer from RW <> NuStep seat, then RW <> recliner at end of session - increased time but good awareness

## 2024-03-01 NOTE — PROGRESS NOTES
Department of Physical Medicine & Rehabilitation  Progress Note    Patient Identification:  Leeroy Aguirre  [id number]  : 1940  Admit date: 2024    Chief Complaint: Closed compression fracture of L2 lumbar vertebra, initial encounter ([facility])    Subjective:   No acute events overnight.   Patient seen this afternoon sitting up in room. He reports appetite remains poor. He has been trying to eat more and stay hydrated. Pain controlled. Wife updated at the bedside.   Labs reviewed.     ROS: No f/c, emesis, cp     Objective:  Patient Vitals for the past 24 hrs:   BP Temp Temp src Pulse Resp SpO2 Weight   24 0827 -- -- -- -- -- 95 % --   24 0728 132/78 97.7 °F (36.5 °C) Oral 78 17 -- --   24 0508 -- -- -- -- -- -- 76 kg (167 lb 8.8 oz)   24 2136 -- -- -- -- 16 -- --   24 2100 (!) 125/90 98.3 °F (36.8 °C) Oral 73 16 95 % --     Const: Alert. No distress, pleasant.   HEENT: Normocephalic, atraumatic. Normal sclera/conjunctiva. MMM.   CV: Regular rate and rhythm.   Resp: No respiratory distress. Lungs CTAB.   Abd: Soft, nontender, nondistended, NABS+   Ext: No edema.   MSK: TLSO in place  Neuro: Alert, oriented, appropriately interactive.   Psych: Cooperative, appropriate mood and affect    Laboratory data: Available via EMR.   Last 24 hour lab  No results found for this or any previous visit (from the past 24 hour(s)).        Therapy progress:  Physical therapy:  Bed Mobility:  Overall Assistance Level: Stand By Assist  Additional Factors: Head of bed flat, Increased time to complete, Without handrails, Verbal cues  Sit>supine:  Assistance Level: Stand by assist  Skilled Clinical Factors: practiced log rolling x 4 times with R handrail on bed - cues to avoid twisting and to slow down mechanics - moderate carryover and improves with practice. Needs assist for managing TLSO brace while maintaining precautions  Supine>sit:  Assistance Level: Stand by assist  Skilled Clinical Factors:  fractures  -Managed conservatively per [facility] (Dr. Grace)  -TLSO when OOB  -PT/OT     Systolic murmur  -Per patient has h/o Rheumatic fever     H/o irregular HR per EMR  -PVCs noted on telemetry  -monitor     RAHEEM  -Noted     OA  -pain control  -PT/OT     Bladder: h/o BPH, High risk retention   -Monitor PVRs, SC prn >400cc  -continue finasteride and terazosin      Bowel: constipation  -High risk constipation   -senna+colace BID, PRN miralax, MoM, and bisacodyl supp.     Safety   -fall precautions     Pain control  -continue acetaminophen prn, added lidocaine patch and prn tramadol     PPx  -DVT: lovenox  -GI: pantoprazole    Rehab Progress: Making progress. Overall standby assist for mobility.  Babar-SBA for ADLs.  Barriers include: Spine precautions/TLSO brace, pain, insight, steps to enter home, wife recently s/p ICD/PPM.    Anticipated Dispo: home with spouse  Services:  PT, OT, RN  DME: ISAEL CHANG  ELOS: 3/5      Marian Mendoza MD 3/1/2024, 3:16 PM

## 2024-03-01 NOTE — PLAN OF CARE
Problem: Gastrointestinal - Adult  Goal: Maintains adequate nutritional intake  Outcome: Not Progressing  Flowsheets (Taken 3/1/2024 0703)  Maintains adequate nutritional intake:   Monitor percentage of each meal consumed   Identify factors contributing to decreased intake, treat as appropriate   Assist with meals as needed   Monitor intake and output, weight and lab values   Obtain nutritional consult as needed     Problem: Nutrition Deficit:  Goal: Optimize nutritional status  Outcome: Not Progressing  Flowsheets (Taken 3/1/2024 1000)  Nutrient intake appropriate for improving, restoring, or maintaining nutritional needs: Assess nutritional status and recommend course of action  Note: Refusing current supplements will try ensure clear     Problem: Discharge Planning  Goal: Discharge to home or other facility with appropriate resources  Outcome: Progressing  Flowsheets  Taken 3/1/2024 1000  Discharge to home or other facility with appropriate resources: Identify barriers to discharge with patient and caregiver  Taken 3/1/2024 0703  Discharge to home or other facility with appropriate resources:   Identify barriers to discharge with patient and caregiver   Arrange for needed discharge resources and transportation as appropriate   Identify discharge learning needs (meds, wound care, etc)   Arrange for interpreters to assist at discharge as needed     Problem: Safety - Adult  Goal: Free from fall injury  Outcome: Progressing  Flowsheets  Taken 3/1/2024 1000  Free From Fall Injury: Instruct family/caregiver on patient safety  Taken 3/1/2024 0907  Free From Fall Injury: Instruct family/caregiver on patient safety  Note: No device, doing well     Problem: ABCDS Injury Assessment  Goal: Absence of physical injury  Outcome: Progressing  Flowsheets (Taken 3/1/2024 0907)  Absence of Physical Injury: Implement safety measures based on patient assessment     Problem: Pain  Goal: Verbalizes/displays adequate comfort level or

## 2024-03-01 NOTE — PROGRESS NOTES
Occupational Therapy  Facility/Department: [address] REHAB  Rehabilitation Occupational Therapy Daily Treatment Note    Date: 3/1/24  Patient Name: Leeroy Aguirre       Room: H8J-6738/3261-01  MRN: [id number]  Account: [id number]   : 1940  (83 y.o.) Gender: male                    Past Medical History:  has a past medical history of Arthritis, BPH (benign prostatic hyperplasia), GERD (gastroesophageal reflux disease), Hyperlipidemia, Irregular heartbeat, and Sleep apnea.  Past Surgical History:   has a past surgical history that includes Tonsillectomy; Finger surgery; Foot neuroma surgery; Cataract removal; Foot surgery (2012); eye surgery (Left); Cataract removal with implant (Bilateral); Colonoscopy (2017); Upper gastrointestinal endoscopy (2021); and Upper gastrointestinal endoscopy (N/A, 2021).    Restrictions  Restrictions/Precautions: Fall Risk  Other position/activity restrictions: TLSO brace when OOB; T8 and L2 compression fx  Equipment Used: Other (recliner)    Subjective  Subjective: met in room, he is seated in recliner, TLSO on, agreeable for shower  Restrictions/Precautions: Fall Risk             Objective     Cognition  Overall Cognitive Status: WNL  Memory: Decreased recall of precautions  Cognition Comment: Able to recall 3/3 BLT precautions, occasional cues to maintain during ADLs/IADLs  Orientation  Overall Orientation Status: Within Normal Limits  Orientation Level: Oriented X4         ADL  Feeding  Assistance Level: Independent  Skilled Clinical Factors: decreased appetite, says food tastes \"terrible\"  Grooming/Oral Hygiene  Assistance Level: Independent  Skilled Clinical Factors: stood at sink for hair & oral care tasks  Upper Extremity Bathing  Assistance Level: Independent  Skilled Clinical Factors: seated on shower chair, he is able to wash, rinse & dry UB IND'ly after set up  Lower Extremity Bathing  Assistance Level: Modified independent  Skilled Clinical  @ his feet, improved ability to cross legs and used SA &  LH shoe horn to manage socks & shoes. He is able to doff/don TLSO while standing, cues for proper alignment & adjusting it when seated as it shifts upwards to his neck. He is supervision for toilet tasks, IND w/ toilet t/f whether using TSF or GBs. He is inquiring when he gets to drive again however will need MD's clearance. Needs f/u w/ neuro spine for when TLSO can be discontinued.  Recommend continued OT services on rehab thru Tues 3/5 to meet higher level of IND to return home w/ wife's support & HH OT services  Activity Tolerance: Patient tolerated treatment well  Discharge Recommendations: Home with assist PRN;Home with Home health OT;S Level 1  Factors Affecting Discharge: has BLT prec, wearing TLSO, mild fall risk  OT Equipment Recommendations  Other: (owns reacher,  portable tub GB, shower stool) pt's daughter ordered a new LH shoe horn, wide SA, TSF-- he declined the TTB  Safety Devices  Safety Devices in place: Yes  Type of devices: Gait belt;Call light within reach;Left in chair;Chair alarm in place;Nurse notified    Patient Education  Education  Education Given To: Patient  Education Provided: Precautions;Safety;IADL Function;ADL Function  Education Provided Comments: reviewed BLT prec, wearing of TLSO, using reacher during ADLs/IADLs to avoid bending over (BLT prec), reviewed 5-10 lb lifting restriction during laundry task  Education Method: Demonstration;Verbal;Teach Back  Barriers to Learning: None  Education Outcome: Verbalized understanding;Demonstrated understanding;Continued education needed  Skilled Clinical Factors: improved recall of BLT prec    Plan  Occupational Therapy Plan  Times Per Week: 5-6  Times Per Day: Twice a day  Specific Instructions for Next Treatment: DC set for Tues 3/5/24 to home w/ HH OT services and family support  Current Treatment Recommendations: Strengthening;Balance training;Functional mobility

## 2024-03-01 NOTE — PLAN OF CARE
breakdown  Description: 1.  Monitor for areas of redness and/or skin breakdown  2.  Assess vascular access sites hourly  3.  Every 4-6 hours minimum:  Change oxygen saturation probe site  4.  Every 4-6 hours:  If on nasal continuous positive airway pressure, respiratory therapy assess nares and determine need for appliance change or resting period.  2/29/2024 1904 by Dayanara Wright, RN  Outcome: Progressing  2/29/2024 1014 by Connie Escoto, RN  Outcome: Progressing     Problem: Nutrition Deficit:  Goal: Optimize nutritional status  2/29/2024 1904 by Dayanara Wright, RN  Outcome: Progressing  2/29/2024 1014 by Connie Escoto, RN  Outcome: Not Progressing
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
## 2024-03-02 PROCEDURE — 6360000002 HC RX W HCPCS: Performed by: PHYSICAL MEDICINE & REHABILITATION

## 2024-03-02 PROCEDURE — 1280000000 HC REHAB R&B

## 2024-03-02 PROCEDURE — 6370000000 HC RX 637 (ALT 250 FOR IP): Performed by: PHYSICAL MEDICINE & REHABILITATION

## 2024-03-02 PROCEDURE — 97110 THERAPEUTIC EXERCISES: CPT

## 2024-03-02 PROCEDURE — 94760 N-INVAS EAR/PLS OXIMETRY 1: CPT

## 2024-03-02 PROCEDURE — 97116 GAIT TRAINING THERAPY: CPT

## 2024-03-02 RX ADMIN — DICLOFENAC SODIUM 2 G: 10 GEL TOPICAL at 14:18

## 2024-03-02 RX ADMIN — SENNOSIDES AND DOCUSATE SODIUM 1 TABLET: 8.6; 5 TABLET ORAL at 08:29

## 2024-03-02 RX ADMIN — DICLOFENAC SODIUM 2 G: 10 GEL TOPICAL at 22:00

## 2024-03-02 RX ADMIN — DICLOFENAC SODIUM 2 G: 10 GEL TOPICAL at 08:33

## 2024-03-02 RX ADMIN — FINASTERIDE 5 MG: 5 TABLET, FILM COATED ORAL at 19:54

## 2024-03-02 RX ADMIN — ENOXAPARIN SODIUM 40 MG: 100 INJECTION SUBCUTANEOUS at 08:28

## 2024-03-02 RX ADMIN — DORZOLAMIDE HYDROCHLORIDE AND TIMOLOL MALEATE 1 DROP: 20; 5 SOLUTION/ DROPS OPHTHALMIC at 22:00

## 2024-03-02 RX ADMIN — TRAMADOL HYDROCHLORIDE 50 MG: 50 TABLET ORAL at 19:54

## 2024-03-02 RX ADMIN — TRAMADOL HYDROCHLORIDE 50 MG: 50 TABLET ORAL at 06:59

## 2024-03-02 RX ADMIN — TERAZOSIN 5 MG: 5 CAPSULE ORAL at 19:57

## 2024-03-02 RX ADMIN — DORZOLAMIDE HYDROCHLORIDE AND TIMOLOL MALEATE 1 DROP: 20; 5 SOLUTION/ DROPS OPHTHALMIC at 08:33

## 2024-03-02 ASSESSMENT — PAIN DESCRIPTION - ORIENTATION
ORIENTATION: LOWER
ORIENTATION: LOWER;MID

## 2024-03-02 ASSESSMENT — PAIN SCALES - GENERAL
PAINLEVEL_OUTOF10: 0
PAINLEVEL_OUTOF10: 6
PAINLEVEL_OUTOF10: 7
PAINLEVEL_OUTOF10: 7

## 2024-03-02 ASSESSMENT — PAIN - FUNCTIONAL ASSESSMENT
PAIN_FUNCTIONAL_ASSESSMENT: PREVENTS OR INTERFERES SOME ACTIVE ACTIVITIES AND ADLS
PAIN_FUNCTIONAL_ASSESSMENT: ACTIVITIES ARE NOT PREVENTED

## 2024-03-02 ASSESSMENT — PAIN SCALES - WONG BAKER
WONGBAKER_NUMERICALRESPONSE: 0
WONGBAKER_NUMERICALRESPONSE: 0

## 2024-03-02 ASSESSMENT — PAIN DESCRIPTION - LOCATION
LOCATION: BACK
LOCATION: BACK

## 2024-03-02 ASSESSMENT — PAIN DESCRIPTION - DESCRIPTORS
DESCRIPTORS: ACHING
DESCRIPTORS: ACHING
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
## 2024-03-02 NOTE — PROGRESS NOTES
Patient admitted to rehab with closed compression fracture.  A/Ox4. Transfers with no device. Mobility restrictions: TLSO brace when OOB. On Regular, MARGARET diet, tolerating poorly due to distaste for hospital food. Medications taken whole with thin liquids. On Lovenox for DVT prophylaxis.  Skin: scattered bruising. Oxygen: RA. LDA: None. Has been continent of bowel and continent of bladder. LBM 3/1. Chair/bed alarms in use and call light in reach.

## 2024-03-02 NOTE — PLAN OF CARE
Problem: Safety - Adult  Goal: Free from fall injury  3/1/2024 2239 by Diamond Joyce, RN  Note: Fall risk band on patient. Orange light on outside of room. Non skid footwear in place. Alarms used appropriately. Patient instructed to call and wait for staff before getting up. Rounding done to anticipate needs. Appropriate safety devices used for transfers.

## 2024-03-03 VITALS
WEIGHT: 165.57 LBS | TEMPERATURE: 97.6 F | OXYGEN SATURATION: 93 % | HEART RATE: 83 BPM | BODY MASS INDEX: 25.99 KG/M2 | DIASTOLIC BLOOD PRESSURE: 80 MMHG | RESPIRATION RATE: 16 BRPM | HEIGHT: 67 IN | SYSTOLIC BLOOD PRESSURE: 114 MMHG

## 2024-03-03 PROCEDURE — 6370000000 HC RX 637 (ALT 250 FOR IP): Performed by: STUDENT IN AN ORGANIZED HEALTH CARE EDUCATION/TRAINING PROGRAM

## 2024-03-03 PROCEDURE — 6370000000 HC RX 637 (ALT 250 FOR IP): Performed by: PHYSICAL MEDICINE & REHABILITATION

## 2024-03-03 PROCEDURE — 6360000002 HC RX W HCPCS: Performed by: PHYSICAL MEDICINE & REHABILITATION

## 2024-03-03 PROCEDURE — 1280000000 HC REHAB R&B

## 2024-03-03 PROCEDURE — 94760 N-INVAS EAR/PLS OXIMETRY 1: CPT

## 2024-03-03 RX ORDER — LOPERAMIDE HYDROCHLORIDE 2 MG/1
2 CAPSULE ORAL ONCE
Status: COMPLETED | OUTPATIENT
Start: 2024-03-03 | End: 2024-03-03

## 2024-03-03 RX ORDER — CALCIUM CARBONATE 500 MG/1
500 TABLET, CHEWABLE ORAL 3 TIMES DAILY PRN
Status: DISCONTINUED | OUTPATIENT
Start: 2024-03-03 | End: 2024-03-05 | Stop reason: HOSPADM

## 2024-03-03 RX ADMIN — DORZOLAMIDE HYDROCHLORIDE AND TIMOLOL MALEATE 1 DROP: 20; 5 SOLUTION/ DROPS OPHTHALMIC at 20:45

## 2024-03-03 RX ADMIN — FINASTERIDE 5 MG: 5 TABLET, FILM COATED ORAL at 20:47

## 2024-03-03 RX ADMIN — TRAMADOL HYDROCHLORIDE 50 MG: 50 TABLET ORAL at 20:46

## 2024-03-03 RX ADMIN — ENOXAPARIN SODIUM 40 MG: 100 INJECTION SUBCUTANEOUS at 08:20

## 2024-03-03 RX ADMIN — LOPERAMIDE HYDROCHLORIDE 2 MG: 2 CAPSULE ORAL at 09:28

## 2024-03-03 RX ADMIN — DICLOFENAC SODIUM 2 G: 10 GEL TOPICAL at 08:20

## 2024-03-03 RX ADMIN — TERAZOSIN 5 MG: 5 CAPSULE ORAL at 20:45

## 2024-03-03 RX ADMIN — DICLOFENAC SODIUM 2 G: 10 GEL TOPICAL at 20:45

## 2024-03-03 RX ADMIN — DORZOLAMIDE HYDROCHLORIDE AND TIMOLOL MALEATE 1 DROP: 20; 5 SOLUTION/ DROPS OPHTHALMIC at 08:20

## 2024-03-03 ASSESSMENT — PAIN DESCRIPTION - DESCRIPTORS: DESCRIPTORS: ACHING

## 2024-03-03 ASSESSMENT — PAIN - FUNCTIONAL ASSESSMENT: PAIN_FUNCTIONAL_ASSESSMENT: PREVENTS OR INTERFERES SOME ACTIVE ACTIVITIES AND ADLS

## 2024-03-03 ASSESSMENT — PAIN DESCRIPTION - LOCATION: LOCATION: KNEE

## 2024-03-03 ASSESSMENT — PAIN SCALES - GENERAL: PAINLEVEL_OUTOF10: 5

## 2024-03-03 ASSESSMENT — PAIN SCALES - WONG BAKER
WONGBAKER_NUMERICALRESPONSE: 0
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
## 2024-03-03 NOTE — PLAN OF CARE
Problem: Discharge Planning  Goal: Discharge to home or other facility with appropriate resources  3/3/2024 0849 by Sia Dover RN  Outcome: Progressing     Problem: Safety - Adult  Goal: Free from fall injury  3/3/2024 0849 by Sia Dover RN  Outcome: Progressing     Problem: ABCDS Injury Assessment  Goal: Absence of physical injury  3/3/2024 0849 by Sia Dover RN  Outcome: Progressing     Problem: Pain  Goal: Verbalizes/displays adequate comfort level or baseline comfort level  3/3/2024 0849 by Sia Dover RN  Outcome: Progressing     Problem: Neurosensory - Adult  Goal: Achieves stable or improved neurological status  3/3/2024 0849 by Sia Dover RN  Outcome: Progressing     Problem: Neurosensory - Adult  Goal: Achieves maximal functionality and self care  3/3/2024 0849 by Sia Dover RN  Outcome: Progressing     Problem: Skin/Tissue Integrity - Adult  Goal: Skin integrity remains intact  3/3/2024 0849 by Sia Dover RN  Outcome: Progressing     Problem: Musculoskeletal - Adult  Goal: Return mobility to safest level of function  3/3/2024 0849 by Sia Dover RN  Outcome: Progressing     Problem: Musculoskeletal - Adult  Goal: Return ADL status to a safe level of function  3/3/2024 0849 by Sia Dover RN  Outcome: Progressing     Problem: Gastrointestinal - Adult  Goal: Maintains or returns to baseline bowel function  3/3/2024 0849 by Sia Dover RN  Outcome: Progressing     Problem: Gastrointestinal - Adult  Goal: Maintains adequate nutritional intake  3/3/2024 0849 by Sia Dover RN  Outcome: Progressing     Problem: Nutrition Deficit:  Goal: Optimize nutritional status  3/3/2024 0849 by Sia Dover RN  Outcome: Progressing     Problem: Skin/Tissue Integrity  Goal: Absence of new skin breakdown  Description: 1.  Monitor for areas of redness and/or skin breakdown  2.  Assess
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
## 2024-03-03 NOTE — PROGRESS NOTES
Department of Physical Medicine & Rehabilitation  Progress Note    Patient Identification:  Leeroy Aguirre  [id number]  : 1940  Admit date: 2024    Chief Complaint: Closed compression fracture of L2 lumbar vertebra, initial encounter ([facility])    Subjective:   No acute events overnight.   Patient states that all the hospital food he has been eating tastes funny, and that what his daughter has brought him from outside has tasted well. Discussed sodium.    ROS: No f/c, emesis, cp     Objective:  Patient Vitals for the past 24 hrs:   BP Temp Temp src Pulse Resp SpO2 Weight   24 -- -- -- -- 18 -- --   24 108/80 97.7 °F (36.5 °C) Oral 84 18 93 % --   24 -- -- -- -- 16 -- --   24 0833 95/68 98.2 °F (36.8 °C) Oral 89 16 99 % --   24 0729 -- -- -- -- 16 -- --   24 0659 -- -- -- -- 16 -- --   24 0600 -- -- -- -- -- -- 75.9 kg (167 lb 5.3 oz)     Const: Alert. No distress, pleasant.   HEENT: Normocephalic, atraumatic. Normal sclera/conjunctiva. MMM.   CV: Regular rate and rhythm.   Resp: No respiratory distress. Lungs CTAB.   Abd: Soft, nontender, nondistended, NABS+   Ext: No edema.   MSK: TLSO in place  Neuro: Alert, oriented, appropriately interactive.   Psych: Cooperative, appropriate mood and affect    Laboratory data: Available via EMR.   Last 24 hour lab  No results found for this or any previous visit (from the past 24 hour(s)).        Therapy progress:  Physical therapy:  Bed Mobility:  Overall Assistance Level: Stand By Assist  Additional Factors: Head of bed flat, Increased time to complete, Without handrails, Verbal cues  Sit>supine:  Assistance Level: Stand by assist  Skilled Clinical Factors: practiced log rolling x 4 times with R handrail on bed - cues to avoid twisting and to slow down mechanics - moderate carryover and improves with practice. Needs assist for managing TLSO brace while maintaining precautions  Supine>sit:  Assistance Level: Stand

## 2024-03-03 NOTE — PLAN OF CARE
Problem: Discharge Planning  Goal: Discharge to home or other facility with appropriate resources  Outcome: Progressing     Problem: Safety - Adult  Goal: Free from fall injury  Outcome: Progressing     Problem: ABCDS Injury Assessment  Goal: Absence of physical injury  Outcome: Progressing     Problem: Pain  Goal: Verbalizes/displays adequate comfort level or baseline comfort level  Outcome: Progressing     Problem: Neurosensory - Adult  Goal: Achieves stable or improved neurological status  Outcome: Progressing  Goal: Achieves maximal functionality and self care  Outcome: Progressing     Problem: Skin/Tissue Integrity - Adult  Goal: Skin integrity remains intact  Outcome: Progressing     Problem: Musculoskeletal - Adult  Goal: Return mobility to safest level of function  Outcome: Progressing  Goal: Return ADL status to a safe level of function  Outcome: Progressing     Problem: Gastrointestinal - Adult  Goal: Maintains or returns to baseline bowel function  Outcome: Progressing  Goal: Maintains adequate nutritional intake  Outcome: Progressing     Problem: Nutrition Deficit:  Goal: Optimize nutritional status  Outcome: Progressing     Problem: Skin/Tissue Integrity  Goal: Absence of new skin breakdown  Description: 1.  Monitor for areas of redness and/or skin breakdown  2.  Assess vascular access sites hourly  3.  Every 4-6 hours minimum:  Change oxygen saturation probe site  4.  Every 4-6 hours:  If on nasal continuous positive airway pressure, respiratory therapy assess nares and determine need for appliance change or resting period.  Outcome: Progressing

## 2024-03-03 NOTE — PLAN OF CARE
Problem: Discharge Planning  Goal: Discharge to home or other facility with appropriate resources  3/2/2024 2030 by Dayanara Wright RN  Outcome: Progressing  3/2/2024 1908 by Sia Dover RN  Outcome: Progressing     Problem: Safety - Adult  Goal: Free from fall injury  3/2/2024 2030 by Dayanara Wright RN  Outcome: Progressing  3/2/2024 1908 by Sia Dover RN  Outcome: Progressing     Problem: ABCDS Injury Assessment  Goal: Absence of physical injury  3/2/2024 2030 by Dayanara Wright RN  Outcome: Progressing  3/2/2024 1908 by Sia Dover RN  Outcome: Progressing     Problem: Pain  Goal: Verbalizes/displays adequate comfort level or baseline comfort level  3/2/2024 2030 by Dayanara Wright RN  Outcome: Progressing  3/2/2024 1908 by Sia Dover RN  Outcome: Progressing     Problem: Neurosensory - Adult  Goal: Achieves stable or improved neurological status  3/2/2024 2030 by Dayanara Wright RN  Outcome: Progressing  3/2/2024 1908 by Sia Dover RN  Outcome: Progressing  Goal: Achieves maximal functionality and self care  3/2/2024 2030 by Dayanara Wright RN  Outcome: Progressing  3/2/2024 1908 by Sia Dover RN  Outcome: Progressing     Problem: Skin/Tissue Integrity - Adult  Goal: Skin integrity remains intact  3/2/2024 2030 by Dayanara Wright RN  Outcome: Progressing  3/2/2024 1908 by Sia Dover RN  Outcome: Progressing     Problem: Musculoskeletal - Adult  Goal: Return mobility to safest level of function  3/2/2024 2030 by Dayanara Wright RN  Outcome: Progressing  3/2/2024 1908 by Sia Dover RN  Outcome: Progressing  Goal: Return ADL status to a safe level of function  3/2/2024 2030 by Dayanara Wright RN  Outcome: Progressing  3/2/2024 1908 by Sia Dover RN  Outcome: Progressing     Problem: Gastrointestinal - Adult  Goal: Maintains or returns to baseline bowel function  3/2/2024 2030 by Dayanara Wright RN  Outcome:
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
## 2024-03-04 LAB
ANION GAP SERPL CALCULATED.3IONS-SCNC: 6 MMOL/L (ref 3–16)
BASOPHILS # BLD: 0 K/UL (ref 0–0.2)
BASOPHILS NFR BLD: 0.4 %
BUN SERPL-MCNC: 15 MG/DL (ref 7–20)
CALCIUM SERPL-MCNC: 8.9 MG/DL (ref 8.3–10.6)
CHLORIDE SERPL-SCNC: 101 MMOL/L (ref 99–110)
CO2 SERPL-SCNC: 29 MMOL/L (ref 21–32)
CREAT SERPL-MCNC: 0.8 MG/DL (ref 0.8–1.3)
DEPRECATED RDW RBC AUTO: 13.5 % (ref 12.4–15.4)
EOSINOPHIL # BLD: 0.4 K/UL (ref 0–0.6)
EOSINOPHIL NFR BLD: 6.6 %
GFR SERPLBLD CREATININE-BSD FMLA CKD-EPI: >60 ML/MIN/{1.73_M2}
GLUCOSE SERPL-MCNC: 88 MG/DL (ref 70–99)
HCT VFR BLD AUTO: 35.7 % (ref 40.5–52.5)
HGB BLD-MCNC: 12.3 G/DL (ref 13.5–17.5)
LYMPHOCYTES # BLD: 1 K/UL (ref 1–5.1)
LYMPHOCYTES NFR BLD: 18.7 %
MCH RBC QN AUTO: 31.7 PG (ref 26–34)
MCHC RBC AUTO-ENTMCNC: 34.3 G/DL (ref 31–36)
MCV RBC AUTO: 92.3 FL (ref 80–100)
MONOCYTES # BLD: 0.6 K/UL (ref 0–1.3)
MONOCYTES NFR BLD: 11.2 %
NEUTROPHILS # BLD: 3.5 K/UL (ref 1.7–7.7)
NEUTROPHILS NFR BLD: 63.1 %
PLATELET # BLD AUTO: 235 K/UL (ref 135–450)
PMV BLD AUTO: 7.6 FL (ref 5–10.5)
POTASSIUM SERPL-SCNC: 3.8 MMOL/L (ref 3.5–5.1)
RBC # BLD AUTO: 3.87 M/UL (ref 4.2–5.9)
SODIUM SERPL-SCNC: 136 MMOL/L (ref 136–145)
WBC # BLD AUTO: 5.6 K/UL (ref 4–11)

## 2024-03-04 PROCEDURE — 1280000000 HC REHAB R&B

## 2024-03-04 PROCEDURE — 6370000000 HC RX 637 (ALT 250 FOR IP): Performed by: PHYSICAL MEDICINE & REHABILITATION

## 2024-03-04 PROCEDURE — 97110 THERAPEUTIC EXERCISES: CPT

## 2024-03-04 PROCEDURE — 97530 THERAPEUTIC ACTIVITIES: CPT | Performed by: PHYSICAL THERAPIST

## 2024-03-04 PROCEDURE — 6360000002 HC RX W HCPCS: Performed by: PHYSICAL MEDICINE & REHABILITATION

## 2024-03-04 PROCEDURE — 80048 BASIC METABOLIC PNL TOTAL CA: CPT

## 2024-03-04 PROCEDURE — 85025 COMPLETE CBC W/AUTO DIFF WBC: CPT

## 2024-03-04 PROCEDURE — 97116 GAIT TRAINING THERAPY: CPT | Performed by: PHYSICAL THERAPIST

## 2024-03-04 PROCEDURE — 97110 THERAPEUTIC EXERCISES: CPT | Performed by: PHYSICAL THERAPIST

## 2024-03-04 PROCEDURE — 36415 COLL VENOUS BLD VENIPUNCTURE: CPT

## 2024-03-04 PROCEDURE — 94760 N-INVAS EAR/PLS OXIMETRY 1: CPT

## 2024-03-04 PROCEDURE — 97530 THERAPEUTIC ACTIVITIES: CPT

## 2024-03-04 PROCEDURE — 97535 SELF CARE MNGMENT TRAINING: CPT

## 2024-03-04 RX ORDER — SENNA AND DOCUSATE SODIUM 50; 8.6 MG/1; MG/1
1 TABLET, FILM COATED ORAL 2 TIMES DAILY
COMMUNITY
Start: 2024-03-04

## 2024-03-04 RX ORDER — LIDOCAINE 4 G/G
1 PATCH TOPICAL DAILY
COMMUNITY
Start: 2024-03-05

## 2024-03-04 RX ORDER — POLYETHYLENE GLYCOL 3350 17 G/17G
17 POWDER, FOR SOLUTION ORAL DAILY PRN
Qty: 527 G | Refills: 0 | COMMUNITY
Start: 2024-03-04 | End: 2024-04-03

## 2024-03-04 RX ORDER — TRAMADOL HYDROCHLORIDE 50 MG/1
50 TABLET ORAL EVERY 12 HOURS PRN
Qty: 14 TABLET | Refills: 0 | Status: SHIPPED | OUTPATIENT
Start: 2024-03-04 | End: 2024-03-11

## 2024-03-04 RX ADMIN — DICLOFENAC SODIUM 2 G: 10 GEL TOPICAL at 14:31

## 2024-03-04 RX ADMIN — TRAMADOL HYDROCHLORIDE 50 MG: 50 TABLET ORAL at 09:07

## 2024-03-04 RX ADMIN — ENOXAPARIN SODIUM 40 MG: 100 INJECTION SUBCUTANEOUS at 08:02

## 2024-03-04 RX ADMIN — FINASTERIDE 5 MG: 5 TABLET, FILM COATED ORAL at 20:56

## 2024-03-04 RX ADMIN — DORZOLAMIDE HYDROCHLORIDE AND TIMOLOL MALEATE 1 DROP: 20; 5 SOLUTION/ DROPS OPHTHALMIC at 20:55

## 2024-03-04 RX ADMIN — DICLOFENAC SODIUM 2 G: 10 GEL TOPICAL at 09:04

## 2024-03-04 RX ADMIN — DORZOLAMIDE HYDROCHLORIDE AND TIMOLOL MALEATE 1 DROP: 20; 5 SOLUTION/ DROPS OPHTHALMIC at 08:04

## 2024-03-04 RX ADMIN — DICLOFENAC SODIUM 2 G: 10 GEL TOPICAL at 20:55

## 2024-03-04 RX ADMIN — TERAZOSIN 5 MG: 5 CAPSULE ORAL at 20:55

## 2024-03-04 ASSESSMENT — PAIN DESCRIPTION - LOCATION: LOCATION: BACK

## 2024-03-04 ASSESSMENT — PAIN DESCRIPTION - PAIN TYPE: TYPE: ACUTE PAIN

## 2024-03-04 ASSESSMENT — PAIN SCALES - GENERAL
PAINLEVEL_OUTOF10: 0
PAINLEVEL_OUTOF10: 0
PAINLEVEL_OUTOF10: 4
PAINLEVEL_OUTOF10: 0
PAINLEVEL_OUTOF10: 6

## 2024-03-04 ASSESSMENT — PAIN DESCRIPTION - DESCRIPTORS: DESCRIPTORS: ACHING

## 2024-03-04 ASSESSMENT — PAIN DESCRIPTION - ORIENTATION: ORIENTATION: POSTERIOR;LOWER;MID

## 2024-03-04 NOTE — PLAN OF CARE
Problem: Nutrition Deficit:  Goal: Optimize nutritional status  3/4/2024 1015 by Connie Escoto RN  Outcome: Not Progressing  3/3/2024 2112 by Dayanraa Wright RN  Outcome: Progressing     Problem: Discharge Planning  Goal: Discharge to home or other facility with appropriate resources  3/4/2024 1015 by Connie Escoto RN  Outcome: Progressing  Flowsheets (Taken 3/4/2024 1015)  Discharge to home or other facility with appropriate resources:   Identify barriers to discharge with patient and caregiver   Identify discharge learning needs (meds, wound care, etc)  3/3/2024 2112 by Dayanara Wright RN  Outcome: Progressing     Problem: Safety - Adult  Goal: Free from fall injury  3/4/2024 1015 by Connie Escoto RN  Outcome: Progressing  Flowsheets (Taken 3/4/2024 0952)  Free From Fall Injury: Instruct family/caregiver on patient safety  3/3/2024 2112 by Dayanara Wright RN  Outcome: Progressing     Problem: ABCDS Injury Assessment  Goal: Absence of physical injury  3/4/2024 1015 by Connie Escoto RN  Outcome: Progressing  Flowsheets (Taken 3/4/2024 0952)  Absence of Physical Injury: Implement safety measures based on patient assessment  3/3/2024 2112 by Dayanara Wright RN  Outcome: Progressing     Problem: Pain  Goal: Verbalizes/displays adequate comfort level or baseline comfort level  3/4/2024 1015 by Connie Escoto RN  Outcome: Progressing  Flowsheets (Taken 3/4/2024 0721)  Verbalizes/displays adequate comfort level or baseline comfort level:   Encourage patient to monitor pain and request assistance   Assess pain using appropriate pain scale   Administer analgesics based on type and severity of pain and evaluate response   Implement non-pharmacological measures as appropriate and evaluate response  3/3/2024 2112 by Dayanara Wright RN  Outcome: Progressing     Problem: Neurosensory - Adult  Goal: Achieves stable or improved neurological status  3/4/2024 1015 by Jevon

## 2024-03-04 NOTE — CARE COORDINATION
Met with patient to review DC for tomorrow.  Reviewed home care orders.  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  Suggestion made to set it up before he goes home and then cancel once home after if he decides he does not need.  Otherwise, told him he would have to see his pcp to get new orders written.  He does not want to set up home care.  His wife will pick him up around 2pm on Tuesday.  No DME has been ordered by billie, family was to obtain the TTB, TSF and 3 piece hip kit.   He wants his scripts to be filled at his own pharmacy, [facility].  IMM letter presented.  Discussion held regarding Future transportation needs.  He denied.  JACIEL Stevens     Case Management   [phone]    3/4/2024  4:28 PM
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
## 2024-03-04 NOTE — PROGRESS NOTES
Department of Physical Medicine & Rehabilitation  Progress Note    Patient Identification:  Leeroy Aguirre  [id number]  : 1940  Admit date: 2024    Chief Complaint: Closed compression fracture of L2 lumbar vertebra, initial encounter ([facility])    Subjective:   No acute events overnight.   Patient seen this afternoon sitting up in room. He reports feeling well and looking forward to discharge home tomorrow. We discussed plans for home care, medications, and follow-ups.  Labs reviewed.     ROS: No f/c, emesis, cp     Objective:  Patient Vitals for the past 24 hrs:   BP Temp Temp src Pulse Resp SpO2 Weight   24 1037 -- -- -- -- 18 95 % --   24 0907 -- -- -- -- 17 -- --   24 0729 130/77 97.6 °F (36.4 °C) Oral 77 17 94 % --   24 0556 -- -- -- -- -- -- 74.4 kg (164 lb 0.4 oz)   24 -- -- -- -- 16 -- --   24 -- -- -- -- 16 -- --   24 114/80 97.6 °F (36.4 °C) Oral 83 18 93 % --     Const: Alert. No distress, pleasant.   HEENT: Normocephalic, atraumatic. Normal sclera/conjunctiva. MMM.   CV: Regular rate and rhythm.   Resp: No respiratory distress. Lungs CTAB.   Abd: Soft, nontender, nondistended, NABS+   Ext: No edema.   MSK: TLSO in place  Neuro: Alert, oriented, appropriately interactive.   Psych: Cooperative, appropriate mood and affect    Laboratory data: Available via EMR.   Last 24 hour lab  Recent Results (from the past 24 hour(s))   CBC with Auto Differential    Collection Time: 24  7:05 AM   Result Value Ref Range    WBC 5.6 4.0 - 11.0 K/uL    RBC 3.87 (L) 4.20 - 5.90 M/uL    Hemoglobin 12.3 (L) 13.5 - 17.5 g/dL    Hematocrit 35.7 (L) 40.5 - 52.5 %    MCV 92.3 80.0 - 100.0 fL    MCH 31.7 26.0 - 34.0 pg    MCHC 34.3 31.0 - 36.0 g/dL    RDW 13.5 12.4 - 15.4 %    Platelets 235 135 - 450 K/uL    MPV 7.6 5.0 - 10.5 fL    Neutrophils % 63.1 %    Lymphocytes % 18.7 %    Monocytes % 11.2 %    Eosinophils % 6.6 %    Basophils % 0.4 %    Neutrophils

## 2024-03-04 NOTE — PATIENT CARE CONFERENCE
[facility]  Inpatient Rehabilitation  Weekly Team Conference Note      Date: 3/5/2024  Patient Name:  Leeroy Agiurre    MRN: [id number]  : 1940  Gender: Male  Physician: Dr. Kelly TURNER  Diagnosis: Closed compression fracture of L2 lumbar vertebra, initial encounter ([facility]) [S32.020A]    CASE MANAGEMENT  Assessment: Plan is home. Pt refusing home care at discharge.       PHYSICAL THERAPY    Bed Mobility:  Overall Assistance Level: Independent, Modified Independent  Additional Factors: Increased time to complete, Head of bed flat, With handrails  Sit>supine:  Assistance Level: Modified independent  Skilled Clinical Factors: Good technique  Supine>sit:  Assistance Level: Modified independent  Skilled Clinical Factors: Good technique    Transfers:  Surface: Wheelchair, To bed, From bed  Additional Factors: Increased time to complete  Device: Walker (RW)  Sit>stand:  Assistance Level: Modified independent  Stand>sit:  Assistance Level: Modified independent  Bed<>chair  Technique: Stand step  Assistance Level: Modified independent  Skilled Clinical Factors: EOB <> commode and then commode <> wheelchair w/ RW  Stand Pivot:  Assistance Level: Supervision  Skilled Clinical Factors: transfer from RW <> NuStep seat, then RW <> recliner at end of session - increased time but good awareness to reach back to the chair  Lateral transfer:     Car transfer:  Assistance Level: Modified independent  Skilled Clinical Factors: Initially attempted to step into the car - VC for safety awareness, practice backing up to the seat. Able to demonstrate remainder of transfer w/ BLT precautions    Ambulation:  Surface: Level surface, Carpet  Device:  (no device)  Distance: 200', 400', 60' x2, 50' with 2 turns on carpet  Activity: Within Unit  Additional Factors: Increased time to complete  Assistance Level: Modified independent  Gait Deviations: Slow willam  Skilled Clinical Factors: pt demonstrates improved step length

## 2024-03-04 NOTE — PROGRESS NOTES
over to closet to retrieve clothes and transported into bathrm. He was IND w/ shower level bathing using LH sponge, GBs & shower chair; able to stand w/ MI using 1 hand to GB, improved recall of BLT prec. He is able to use reacher to doff clothing from @ his feet, improved ability to cross legs and used SA &  LH shoe horn to manage socks & shoes--extra time to manage (likes to keep his shoelaces tied loosely to slip off/on). He is able to doff/don TLSO while standing, safety concerns for proper alignment & adjusting it when seated as it shifts upwards to his neck. He is IND for toilet tasks, IND w/ toilet t/f whether using TSF or GBs. He is inquiring when he gets to drive again however will need ortho MD's clearance & when TLSO can be discontinued.  DC to home on Tues 3/5 w/ family support--recommend Home OT services to meet higher level of IND w/ IADLs  Activity Tolerance: Patient tolerated treatment well  Discharge Recommendations: Home with assist PRN;Home with Home health OT;S Level 1  Factors Affecting Discharge: has BLT prec, wearing TLSO  OT Equipment Recommendations  Other: (owns reacher,  portable tub GB, shower stool) pt's daughter ordered a new LH shoe horn, wide SA, TSF-- he declined the TTB  Safety Devices  Safety Devices in place: Yes  Type of devices: Call light within reach;Chair alarm in place;Left in chair;Gait belt    Patient Education  Education  Education Given To: Patient  Education Provided: Precautions;Safety;IADL Function;ADL Function  Education Provided Comments: reviewed BLT prec, wearing of TLSO, using reacher during ADLs/IADLs to avoid bending over (BLT prec), reviewed 5-10 lb lifting restriction during laundry task  Education Method: Demonstration;Verbal;Teach Back  Barriers to Learning: None  Education Outcome: Verbalized understanding;Demonstrated understanding;Continued education needed  Skilled Clinical Factors: improved recall of BLT prec    Plan  Occupational Therapy

## 2024-03-04 NOTE — DISCHARGE INSTR - COC
Hypercholesterolemia E78.00    BPH (benign prostatic hyperplasia) N40.0    Lower back pain M54.50    Closed compression fracture of L2 lumbar vertebra, initial encounter ([facility]) S32.020A       Isolation/Infection:   Isolation            No Isolation          Patient Infection Status       None to display            Nurse Assessment:  Last Vital Signs: /77   Pulse 77   Temp 97.6 °F (36.4 °C) (Oral)   Resp 18   Ht 1.702 m (5' 7.01\")   Wt 74.4 kg (164 lb 0.4 oz)   SpO2 95%   BMI 25.68 kg/m²     Last documented pain score (0-10 scale): Pain Level: 4  Last Weight:   Wt Readings from Last 1 Encounters:   03/04/24 74.4 kg (164 lb 0.4 oz)     Mental Status:  Alert and oriented x4 forgetful at times    IV Access:  - None    Nursing Mobility/ADLs:  Walking   Independent SBA  Transfer  Independent  Bathing  Assisted  Dressing  Assisted  Toileting  Independent  Feeding  Independent  Med Admin  Assisted  Med Delivery   whole and thins    Wound Care Documentation and Therapy:  Incision 03/06/12 Foot Left (Active)   Number of days: 4381        Elimination:  Continence:   Bowel: Yes  Bladder: Yes  Urinary Catheter: None   Colostomy/Ileostomy/Ileal Conduit: No       Date of Last BM: 3/3    Intake/Output Summary (Last 24 hours) at 3/4/2024 1142  Last data filed at 3/4/2024 1013  Gross per 24 hour   Intake 1000 ml   Output 101 ml   Net 899 ml     I/O last 3 completed shifts:  In: 1280 [P.O.:1280]  Out: 400 [Urine:400]    Safety Concerns:     At Risk for Falls    Impairments/Disabilities:      None    Nutrition Therapy:  Current Nutrition Therapy:   - Oral Diet:  General    Routes of Feeding: Oral  Liquids: Thin Liquids  Daily Fluid Restriction: no  Last Modified Barium Swallow with Video (Video Swallowing Test): not done    Treatments at the Time of Hospital Discharge:   Respiratory Treatments: None  Oxygen Therapy:  is not on home oxygen therapy.  Ventilator:    - No ventilator support    Rehab Therapies: Patient currently
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
## 2024-03-04 NOTE — PROGRESS NOTES
Physical Therapy  Facility/Department: [address] REHAB  Rehabilitation Physical Therapy Treatment Note/Discharge Summary    NAME: Leeroy Aguirre  : 1940 (83 y.o.)  MRN: [id number]  CODE STATUS: DNR-CCA    Date of Service: 3/4/24       Restrictions:  Restrictions/Precautions: Fall Risk  Position Activity Restriction  Spinal Precautions: No Bending, No Lifting, No Twisting (pt recalls)  Other position/activity restrictions: TLSO brace when OOB; T8 and L2 compression fx     SUBJECTIVE  Subjective  Subjective: Pt reports ready for D/C tomorrow after his wife's Dr appt.  Pain: Had some LBP earlier but repositioned and provided relief.    OBJECTIVE  Cognition  Overall Cognitive Status: WNL  Memory: Decreased recall of precautions  Cognition Comment: Able to recall 3/3 BLT precautions & use of A/E, less cues to maintain during ADLs/IADLs  Orientation  Overall Orientation Status: Within Normal Limits  Orientation Level: Oriented X4    Functional Mobility  Bed Mobility  Overall Assistance Level: Independent;Modified Independent  Additional Factors: Increased time to complete;Head of bed flat;With handrails  Bridging  Assistance Level: Modified independent  Roll Left  Assistance Level: Modified independent  Roll Right  Assistance Level: Modified independent  Sit to Supine  Assistance Level: Modified independent  Skilled Clinical Factors: Good technique  Supine to Sit  Assistance Level: Modified independent  Skilled Clinical Factors: Good technique  Scooting  Assistance Level: Modified independent  Balance  Sitting Balance: Modified independent   Standing Balance: Modified independent  (RW)  Transfers  Surface: Wheelchair;To bed;From bed  Additional Factors: Increased time to complete  Device: Walker (RW)  Sit to Stand  Assistance Level: Modified independent  Stand to Sit  Assistance Level: Modified independent  Bed To/From Chair  Technique: Stand step  Assistance Level: Modified independent  Car Transfer  Assistance

## 2024-03-04 NOTE — PLAN OF CARE
Problem: Discharge Planning  Goal: Discharge to home or other facility with appropriate resources  3/3/2024 2112 by Dayanara Wright RN  Outcome: Progressing  3/3/2024 0849 by Sia Dover RN  Outcome: Progressing     Problem: Safety - Adult  Goal: Free from fall injury  3/3/2024 2112 by Dayanara Wright RN  Outcome: Progressing  3/3/2024 0849 by Sia Dover RN  Outcome: Progressing     Problem: ABCDS Injury Assessment  Goal: Absence of physical injury  3/3/2024 2112 by Dayanara Wright RN  Outcome: Progressing  3/3/2024 0849 by Sia Dover RN  Outcome: Progressing     Problem: Pain  Goal: Verbalizes/displays adequate comfort level or baseline comfort level  3/3/2024 2112 by Dayanara Wright RN  Outcome: Progressing  3/3/2024 0849 by Sia Dover RN  Outcome: Progressing     Problem: Neurosensory - Adult  Goal: Achieves stable or improved neurological status  3/3/2024 2112 by Dayanara Wright RN  Outcome: Progressing  3/3/2024 0849 by Sia Dover RN  Outcome: Progressing  Goal: Achieves maximal functionality and self care  3/3/2024 2112 by Dayanara Wright RN  Outcome: Progressing  3/3/2024 0849 by Sia Dover RN  Outcome: Progressing     Problem: Skin/Tissue Integrity - Adult  Goal: Skin integrity remains intact  3/3/2024 2112 by Dayanara Wright RN  Outcome: Progressing  3/3/2024 0849 by Sia Dover RN  Outcome: Progressing     Problem: Musculoskeletal - Adult  Goal: Return mobility to safest level of function  3/3/2024 2112 by Dayanara Wright RN  Outcome: Progressing  3/3/2024 0849 by Sia Dover RN  Outcome: Progressing  Goal: Return ADL status to a safe level of function  3/3/2024 2112 by Dayanara Wright RN  Outcome: Progressing  3/3/2024 0849 by Sia Dover RN  Outcome: Progressing     Problem: Gastrointestinal - Adult  Goal: Maintains or returns to baseline bowel function  3/3/2024 2112 by Dayanara Wright RN  Outcome:

## 2024-03-04 NOTE — PROGRESS NOTES
Frequent questions about discharge, asking about 20 days? Again reminded does not need referral for [facility], reminded will need PCP to order home care if he does not want it now, left message with Steph discharge planner question about 20 days.

## 2024-03-04 NOTE — CARE COORDINATION
SOCIAL WORK DISCHARGE SUMMARY        DATE OF DISCHARGE:    TUESDAY, 3-5-2024      LOCATION:    hOME      PATIENT REFUSED ALL HOME CARE ORDERS.         TIME:     Wife    around 1 - 2pm         PHARMACY:   [facility] on [address]        DME:    none      Transportation Question  \"No\"      IMM:  presented today.     Referrals made:  none.    JACIEL Stevens     Case Management   [phone]    3/4/2024  4:31 PM

## 2024-03-04 NOTE — PROGRESS NOTES
Asking about surgery, none planned, again reminded to follow up with Salomón, asking about driving.

## 2024-03-04 NOTE — PROGRESS NOTES
Department of Physical Medicine & Rehabilitation  Progress Note    Patient Identification:  Leeroy Aguirre  [id number]  : 1940  Admit date: 2024    Chief Complaint: Closed compression fracture of L2 lumbar vertebra, initial encounter ([facility])    Subjective:   No acute events overnight.   States continued distaste for hospital food. Denies any other complaints.    ROS: No f/c, emesis, cp     Objective:  Patient Vitals for the past 24 hrs:   BP Temp Temp src Pulse Resp SpO2 Weight   24 -- -- -- -- 16 -- --   24 114/80 97.6 °F (36.4 °C) Oral 83 18 93 % --   24 0833 -- -- -- -- 18 94 % --   24 0800 104/66 97.3 °F (36.3 °C) Oral 94 18 93 % --   24 0500 -- -- -- -- -- -- 75.1 kg (165 lb 9.1 oz)     Const: Alert. No distress, pleasant.   HEENT: Normocephalic, atraumatic. Normal sclera/conjunctiva. MMM.   CV: Regular rate and rhythm.   Resp: No respiratory distress. Lungs CTAB.   Abd: Soft, nontender, nondistended, NABS+   Ext: No edema.   MSK: TLSO in place  Neuro: Alert, oriented, appropriately interactive.   Psych: Cooperative, appropriate mood and affect    Laboratory data: Available via EMR.   Last 24 hour lab  No results found for this or any previous visit (from the past 24 hour(s)).        Therapy progress:  Physical therapy:  Bed Mobility:  Overall Assistance Level: Stand By Assist  Additional Factors: Head of bed flat, Increased time to complete, Without handrails, Verbal cues  Sit>supine:  Assistance Level: Stand by assist  Skilled Clinical Factors: practiced log rolling x 4 times with R handrail on bed - cues to avoid twisting and to slow down mechanics - moderate carryover and improves with practice. Needs assist for managing TLSO brace while maintaining precautions  Supine>sit:  Assistance Level: Stand by assist  Skilled Clinical Factors: uses R HR to push up and maintain neutral spine - improved with practice  Transfers:  Surface: To chair with arms, From chair

## 2024-03-04 NOTE — PROGRESS NOTES
Pt..)    Nutrition Interventions:   Food and/or Nutrient Delivery: Continue Current Diet, Discontinue Oral Nutrition Supplement  Nutrition Education/Counseling:  (Monitor need)  Coordination of Nutrition Care: Continue to monitor while inpatient       Goals:  Previous Goal Met: Progress towards Goal(s) Declining  Goals: PO intake 75% or greater       Nutrition Monitoring and Evaluation:   Behavioral-Environmental Outcomes: None Identified  Food/Nutrient Intake Outcomes: Food and Nutrient Intake  Physical Signs/Symptoms Outcomes: Biochemical Data, GI Status    Discharge Planning:    No discharge needs at this time     Isabelle Orozco RD  Contact: [phone]

## 2024-03-05 VITALS
BODY MASS INDEX: 25.99 KG/M2 | RESPIRATION RATE: 18 BRPM | DIASTOLIC BLOOD PRESSURE: 87 MMHG | OXYGEN SATURATION: 96 % | HEIGHT: 67 IN | HEART RATE: 87 BPM | WEIGHT: 165.57 LBS | SYSTOLIC BLOOD PRESSURE: 138 MMHG | TEMPERATURE: 97.6 F

## 2024-03-05 PROCEDURE — 6370000000 HC RX 637 (ALT 250 FOR IP): Performed by: PHYSICAL MEDICINE & REHABILITATION

## 2024-03-05 PROCEDURE — 94760 N-INVAS EAR/PLS OXIMETRY 1: CPT

## 2024-03-05 RX ADMIN — TRAMADOL HYDROCHLORIDE 50 MG: 50 TABLET ORAL at 09:04

## 2024-03-05 RX ADMIN — DORZOLAMIDE HYDROCHLORIDE AND TIMOLOL MALEATE 1 DROP: 20; 5 SOLUTION/ DROPS OPHTHALMIC at 08:48

## 2024-03-05 ASSESSMENT — PAIN SCALES - GENERAL
PAINLEVEL_OUTOF10: 0
PAINLEVEL_OUTOF10: 5
PAINLEVEL_OUTOF10: 3
PAINLEVEL_OUTOF10: 5

## 2024-03-05 ASSESSMENT — PAIN DESCRIPTION - LOCATION
LOCATION: BACK
LOCATION: BACK

## 2024-03-05 ASSESSMENT — PAIN DESCRIPTION - ORIENTATION
ORIENTATION: LOWER
ORIENTATION: LOWER

## 2024-03-05 ASSESSMENT — PAIN DESCRIPTION - DESCRIPTORS
DESCRIPTORS: ACHING
DESCRIPTORS: ACHING

## 2024-03-05 ASSESSMENT — PAIN DESCRIPTION - PAIN TYPE: TYPE: ACUTE PAIN

## 2024-03-05 ASSESSMENT — PAIN SCALES - WONG BAKER: WONGBAKER_NUMERICALRESPONSE: 0

## 2024-03-05 ASSESSMENT — PAIN DESCRIPTION - ONSET: ONSET: ON-GOING

## 2024-03-05 ASSESSMENT — PAIN DESCRIPTION - FREQUENCY: FREQUENCY: CONTINUOUS

## 2024-03-05 ASSESSMENT — PAIN - FUNCTIONAL ASSESSMENT: PAIN_FUNCTIONAL_ASSESSMENT: ACTIVITIES ARE NOT PREVENTED
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
## 2024-03-05 NOTE — PROGRESS NOTES
Pt discharged home with [facility]. Pt wife here for pickup. Pt DC instructions reviewed with pt and wife. All questions asked and answered. Pt belongings with pt at dc include clothes, brace medications, shoes. Pt escorted to vehicle via staff assisted wc.No difficulty with transfer to vehicle.

## 2024-03-05 NOTE — CARE COORDINATION
The Plan for Transition of Care is related to the following treatment goals: Strengthening    The Patient and/or patient representative Tyson Aguirre was provided with a choice of provider and agrees   with the discharge plan. [x] Yes [] No    Freedom of choice list was provided with basic dialogue that supports the patient's individualized plan of care/goals, treatment preferences and shares the quality data associated with the providers. [x] Yes [] No    Electronically signed by MELVI Lilly on 3/5/2024 at 11:52 AM        Pt has given SW permission to contact any home care facility. [facility] is not able to accept Pt due to car insurance likely not paying for services. VARINDER placed call to [facility] and [facility]. [facility] is at capacity and is unable to accept Pt at this time. VARINDER placed call to [facility] is reviewing Pt information and will place return call to confirm they will accept Pt.     Electronically signed by MELVI Lilly on 3/5/2024 at 12:05 PM
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
## 2024-03-05 NOTE — PLAN OF CARE
Problem: Safety - Adult  Goal: Free from fall injury  3/4/2024 2333 by Connie Desai RN  Outcome: Progressing  Note: Patient free from falls this shift. Fall precautions in place at all times. Bed in lowest position with two side rails up and wheels locked. Call light within reach. Patient able and agreeable to contact for safety appropriately. Patient up to bathroom with TLSO brace on when up, gait belt, and standby assist of one. Patient tolerated very well.       Problem: Pain  Goal: Verbalizes/displays adequate comfort level or baseline comfort level  3/4/2024 2333 by Connie Desai, RN  Outcome: Progressing  Note: Patient denied pain or discomfort,  encouraged to inform staff of any changes in position.     Problem: Skin/Tissue Integrity - Adult  Goal: Skin integrity remains intact  3/4/2024 2333 by Connie Desai, RN  Outcome: Progressing  Note: Skin assessment performed each shift per protocol.  Patient turned and repositioned every two hours and prn with pillow support. Patient checked for incontence every two hours.   Surgical incision to back is healing well. No s/s of infection noted, incision is open to air.

## 2024-03-05 NOTE — CARE COORDINATION
SOCIAL WORK DISCHARGE SUMMARY        DATE OF DISCHARGE: 3/5/2024      LOCATION:    Home        HOME CARE AGENCY:  Name: [facility]  Address:   [address], [facility] [address]  Phone:  [phone]  Fax:  [phone]           TIME:   Wife at 12:30pm        [facility]ACY: [facility]        DME: No DME has been ordered by billie, family was to obtain the TTB, TSF and 3 piece hip kit.       Transportation Question \"No\"      IMM: Explained at delivered by JACIEL Stevens on 3/4/2024    Referrals made:  Referral sent to [facility], they accepted Pt at [place] and will follow up in 24-48 hours.     Electronically signed by MELVI Lilly on 3/5/2024 at 2:13 PM

## 2024-03-05 NOTE — PLAN OF CARE
2045)  Maintains or returns to baseline bowel function: Assess bowel function  Goal: Maintains adequate nutritional intake  3/5/2024 1018 by Magan Lares RN  Outcome: Adequate for Discharge  3/5/2024 1017 by Magan Lares RN  Outcome: Progressing  Flowsheets (Taken 3/5/2024 1017)  Maintains adequate nutritional intake:   Monitor percentage of each meal consumed   Identify factors contributing to decreased intake, treat as appropriate  3/4/2024 2333 by Connie Desai RN  Outcome: Progressing  3/4/2024 2333 by Connie Desai RN  Outcome: Progressing  Flowsheets (Taken 3/4/2024 2045)  Maintains adequate nutritional intake: Monitor percentage of each meal consumed     Problem: Nutrition Deficit:  Goal: Optimize nutritional status  3/5/2024 1018 by Magan Lares RN  Outcome: Adequate for Discharge  3/5/2024 1017 by Magan Lares RN  Outcome: Progressing  Flowsheets (Taken 3/5/2024 1017)  Nutrient intake appropriate for improving, restoring, or maintaining nutritional needs:   Assess nutritional status and recommend course of action   Monitor oral intake, labs, and treatment plans  3/4/2024 2333 by Connie Desai RN  Outcome: Progressing  3/4/2024 2333 by Connie Desai RN  Outcome: Progressing     Problem: Skin/Tissue Integrity  Goal: Absence of new skin breakdown  Description: 1.  Monitor for areas of redness and/or skin breakdown  2.  Assess vascular access sites hourly  3.  Every 4-6 hours minimum:  Change oxygen saturation probe site  4.  Every 4-6 hours:  If on nasal continuous positive airway pressure, respiratory therapy assess nares and determine need for appliance change or resting period.  3/5/2024 1018 by Magan Lares RN  Outcome: Adequate for Discharge  3/5/2024 1017 by Magan Lares RN  Outcome: Progressing  3/4/2024 2333 by Connie Desai RN  Outcome: Progressing  3/4/2024 2333 by Connie Desai RN  Outcome: Progressing

## 2024-03-05 NOTE — PLAN OF CARE
Problem: Discharge Planning  Goal: Discharge to home or other facility with appropriate resources  3/5/2024 1017 by Magan Lares RN  Outcome: Progressing  Flowsheets (Taken 3/5/2024 1017)  Discharge to home or other facility with appropriate resources:   Identify barriers to discharge with patient and caregiver   Identify discharge learning needs (meds, wound care, etc)  3/4/2024 2333 by Connie Desai RN  Outcome: Progressing  3/4/2024 2333 by Connie Desai RN  Outcome: Progressing  Flowsheets (Taken 3/4/2024 2045)  Discharge to home or other facility with appropriate resources: Arrange for needed discharge resources and transportation as appropriate     Problem: Safety - Adult  Goal: Free from fall injury  3/5/2024 1017 by Magan Lares RN  Outcome: Progressing  Flowsheets (Taken 3/5/2024 1017)  Free From Fall Injury: Instruct family/caregiver on patient safety  3/4/2024 2333 by Connie Desai RN  Outcome: Progressing  Note: Patient free from falls this shift. Fall precautions in place at all times. Bed in lowest position with two side rails up and wheels locked. Call light within reach. Patient able and agreeable to contact for safety appropriately. Patient up to bathroom with TLSO brace on when up, gait belt, and standby assist of one. Patient tolerated very well.    3/4/2024 2333 by Connie Desai RN  Outcome: Progressing     Problem: ABCDS Injury Assessment  Goal: Absence of physical injury  3/5/2024 1017 by Magan Lares RN  Outcome: Progressing  Flowsheets (Taken 3/4/2024 2310 by Connie Desai RN)  Absence of Physical Injury: Implement safety measures based on patient assessment  3/4/2024 2333 by Connie Desai RN  Outcome: Progressing  3/4/2024 2333 by Connie Desai RN  Outcome: Progressing  Flowsheets (Taken 3/4/2024 2310)  Absence of Physical Injury: Implement safety measures based on patient assessment     Problem: Pain  Goal:
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
## 2024-06-18 ENCOUNTER — HOSPITAL ENCOUNTER (EMERGENCY)
Age: 84
Discharge: HOME | End: 2024-06-18
Attending: EMERGENCY MEDICINE
Payer: MEDICARE

## 2024-06-18 VITALS
HEART RATE: 74 BPM | DIASTOLIC BLOOD PRESSURE: 82 MMHG | RESPIRATION RATE: 17 BRPM | TEMPERATURE: 98 F | SYSTOLIC BLOOD PRESSURE: 136 MMHG | OXYGEN SATURATION: 97 %
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
## 2024-06-18 DIAGNOSIS — T30.0: Primary | ICD-10-CM

## 2024-06-18 PROCEDURE — 6370000000 HC RX 637 (ALT 250 FOR IP): Performed by: EMERGENCY MEDICINE

## 2024-06-18 PROCEDURE — 99283 EMERGENCY DEPT VISIT LOW MDM: CPT

## 2024-06-18 RX ORDER — ACETAMINOPHEN 325 MG/1
650 TABLET ORAL ONCE
Status: COMPLETED | OUTPATIENT
Start: 2024-06-18 | End: 2024-06-18

## 2024-06-18 RX ADMIN — ACETAMINOPHEN 325MG 650 MG: 325 TABLET ORAL at 10:18

## 2024-06-18 ASSESSMENT — PAIN DESCRIPTION - LOCATION
LOCATION: ANKLE
LOCATION: ANKLE

## 2024-06-18 ASSESSMENT — PAIN DESCRIPTION - DESCRIPTORS: DESCRIPTORS: ACHING

## 2024-06-18 ASSESSMENT — PAIN DESCRIPTION - ORIENTATION
ORIENTATION: RIGHT
ORIENTATION: RIGHT

## 2024-06-18 ASSESSMENT — PAIN SCALES - GENERAL
PAINLEVEL_OUTOF10: 3
PAINLEVEL_OUTOF10: 4

## 2024-06-18 ASSESSMENT — PAIN - FUNCTIONAL ASSESSMENT: PAIN_FUNCTIONAL_ASSESSMENT: 0-10

## 2024-06-18 NOTE — ED PROVIDER NOTES
- History obtained from: patient, EMS     I, Richard Olmstead MD, am the primary clinician of record.     During the patient's ED course, the patient was given:  Medications   acetaminophen (TYLENOL) tablet 650 mg (650 mg Oral Given 6/18/24 1018)        Patient was given prescriptions for the following medications. I counseled the patient as to how to take these medications.  New Prescriptions    No medications on file       Patient instructed to follow up with:   Samuel David MD  [id number]  [facility] [id number] [phone]      As needed    [facility]  [address] [facility] [address] [phone]    As needed, if symptoms worsen      All questions were answered and the patient/family expressed understanding and agreement with the plan.     PROCEDURES  None    CRITICAL CARE  N/A    CLINICAL IMPRESSION  1. Scald        DISPOSITION  Decision To Discharge 06/18/2024 10:37:15 AM     Richard Olmstead MD    Note: This chart was created using voice recognition dictation software. Efforts were made by me to ensure accuracy, however some errors may be present due to limitations of this technology and occasionally words are not transcribed correctly.       Richard Olmsetad MD  06/18/24 1038

## 2024-06-18 NOTE — ED NOTES
Pt d/c home with AVS, no s/s of distress noted, ambulated to exit with steady gait.   pt denies questions about follow up

## (undated) DEVICE — BITE BLOCK ENDOSCP AD 60 FR W/ ADJ STRP PLAS GRN BLOX

## (undated) DEVICE — ENDOSCOPY KIT: Brand: MEDLINE INDUSTRIES, INC.